# Patient Record
Sex: FEMALE | Race: WHITE | NOT HISPANIC OR LATINO | URBAN - METROPOLITAN AREA
[De-identification: names, ages, dates, MRNs, and addresses within clinical notes are randomized per-mention and may not be internally consistent; named-entity substitution may affect disease eponyms.]

---

## 2024-06-13 ENCOUNTER — APPOINTMENT (OUTPATIENT)
Dept: RADIOLOGY | Facility: HOSPITAL | Age: 79
End: 2024-06-13
Payer: MEDICARE

## 2024-06-13 ENCOUNTER — APPOINTMENT (OUTPATIENT)
Dept: CARDIOLOGY | Facility: HOSPITAL | Age: 79
End: 2024-06-13
Payer: MEDICARE

## 2024-06-13 ENCOUNTER — HOSPITAL ENCOUNTER (INPATIENT)
Facility: HOSPITAL | Age: 79
DRG: 193 | End: 2024-06-13
Attending: INTERNAL MEDICINE | Admitting: NURSE PRACTITIONER
Payer: MEDICARE

## 2024-06-13 DIAGNOSIS — J18.9 PNEUMONIA OF LEFT LOWER LOBE DUE TO INFECTIOUS ORGANISM: ICD-10-CM

## 2024-06-13 DIAGNOSIS — R53.81 PHYSICAL DECONDITIONING: ICD-10-CM

## 2024-06-13 DIAGNOSIS — J86.9 EMPYEMA (MULTI): Primary | ICD-10-CM

## 2024-06-13 LAB
ABO GROUP (TYPE) IN BLOOD: NORMAL
ALBUMIN SERPL BCP-MCNC: 3.3 G/DL (ref 3.4–5)
ALP SERPL-CCNC: 107 U/L (ref 33–136)
ALT SERPL W P-5'-P-CCNC: 47 U/L (ref 7–45)
ANION GAP SERPL CALC-SCNC: 15 MMOL/L (ref 10–20)
ANTIBODY SCREEN: NORMAL
APTT PPP: 28 SECONDS (ref 27–38)
AST SERPL W P-5'-P-CCNC: 41 U/L (ref 9–39)
BASOPHILS # BLD AUTO: 0.06 X10*3/UL (ref 0–0.1)
BASOPHILS NFR BLD AUTO: 0.3 %
BILIRUB SERPL-MCNC: 1.1 MG/DL (ref 0–1.2)
BUN SERPL-MCNC: 21 MG/DL (ref 6–23)
CALCIUM SERPL-MCNC: 8.8 MG/DL (ref 8.6–10.6)
CHLORIDE SERPL-SCNC: 97 MMOL/L (ref 98–107)
CO2 SERPL-SCNC: 23 MMOL/L (ref 21–32)
CREAT SERPL-MCNC: 0.67 MG/DL (ref 0.5–1.05)
EGFRCR SERPLBLD CKD-EPI 2021: 90 ML/MIN/1.73M*2
EOSINOPHIL # BLD AUTO: 0.02 X10*3/UL (ref 0–0.4)
EOSINOPHIL NFR BLD AUTO: 0.1 %
ERYTHROCYTE [DISTWIDTH] IN BLOOD BY AUTOMATED COUNT: 14.1 % (ref 11.5–14.5)
GLUCOSE SERPL-MCNC: 109 MG/DL (ref 74–99)
HCT VFR BLD AUTO: 34.4 % (ref 36–46)
HGB BLD-MCNC: 11.2 G/DL (ref 12–16)
IMM GRANULOCYTES # BLD AUTO: 0.42 X10*3/UL (ref 0–0.5)
IMM GRANULOCYTES NFR BLD AUTO: 2.2 % (ref 0–0.9)
INR PPP: 1.2 (ref 0.9–1.1)
LYMPHOCYTES # BLD AUTO: 0.74 X10*3/UL (ref 0.8–3)
LYMPHOCYTES NFR BLD AUTO: 3.9 %
MAGNESIUM SERPL-MCNC: 2.3 MG/DL (ref 1.6–2.4)
MCH RBC QN AUTO: 28.7 PG (ref 26–34)
MCHC RBC AUTO-ENTMCNC: 32.6 G/DL (ref 32–36)
MCV RBC AUTO: 88 FL (ref 80–100)
MONOCYTES # BLD AUTO: 1.81 X10*3/UL (ref 0.05–0.8)
MONOCYTES NFR BLD AUTO: 9.6 %
NEUTROPHILS # BLD AUTO: 15.88 X10*3/UL (ref 1.6–5.5)
NEUTROPHILS NFR BLD AUTO: 83.9 %
NRBC BLD-RTO: 0 /100 WBCS (ref 0–0)
PLATELET # BLD AUTO: 410 X10*3/UL (ref 150–450)
POTASSIUM SERPL-SCNC: 3.6 MMOL/L (ref 3.5–5.3)
PROT SERPL-MCNC: 7 G/DL (ref 6.4–8.2)
PROTHROMBIN TIME: 13.4 SECONDS (ref 9.8–12.8)
RBC # BLD AUTO: 3.9 X10*6/UL (ref 4–5.2)
RH FACTOR (ANTIGEN D): NORMAL
SODIUM SERPL-SCNC: 131 MMOL/L (ref 136–145)
WBC # BLD AUTO: 18.9 X10*3/UL (ref 4.4–11.3)

## 2024-06-13 PROCEDURE — 1210000001 HC SEMI-PRIVATE ROOM DAILY

## 2024-06-13 PROCEDURE — 71045 X-RAY EXAM CHEST 1 VIEW: CPT

## 2024-06-13 PROCEDURE — 36415 COLL VENOUS BLD VENIPUNCTURE: CPT | Performed by: STUDENT IN AN ORGANIZED HEALTH CARE EDUCATION/TRAINING PROGRAM

## 2024-06-13 PROCEDURE — 82247 BILIRUBIN TOTAL: CPT | Performed by: STUDENT IN AN ORGANIZED HEALTH CARE EDUCATION/TRAINING PROGRAM

## 2024-06-13 PROCEDURE — 85025 COMPLETE CBC W/AUTO DIFF WBC: CPT | Performed by: STUDENT IN AN ORGANIZED HEALTH CARE EDUCATION/TRAINING PROGRAM

## 2024-06-13 PROCEDURE — 85610 PROTHROMBIN TIME: CPT | Performed by: STUDENT IN AN ORGANIZED HEALTH CARE EDUCATION/TRAINING PROGRAM

## 2024-06-13 PROCEDURE — 71045 X-RAY EXAM CHEST 1 VIEW: CPT | Performed by: RADIOLOGY

## 2024-06-13 PROCEDURE — 99223 1ST HOSP IP/OBS HIGH 75: CPT | Performed by: THORACIC SURGERY (CARDIOTHORACIC VASCULAR SURGERY)

## 2024-06-13 PROCEDURE — 2500000004 HC RX 250 GENERAL PHARMACY W/ HCPCS (ALT 636 FOR OP/ED): Performed by: NURSE PRACTITIONER

## 2024-06-13 PROCEDURE — 99223 1ST HOSP IP/OBS HIGH 75: CPT | Performed by: NURSE PRACTITIONER

## 2024-06-13 PROCEDURE — 93005 ELECTROCARDIOGRAM TRACING: CPT

## 2024-06-13 PROCEDURE — 2550000001 HC RX 255 CONTRASTS: Performed by: INTERNAL MEDICINE

## 2024-06-13 PROCEDURE — 2500000005 HC RX 250 GENERAL PHARMACY W/O HCPCS: Performed by: STUDENT IN AN ORGANIZED HEALTH CARE EDUCATION/TRAINING PROGRAM

## 2024-06-13 PROCEDURE — 2500000001 HC RX 250 WO HCPCS SELF ADMINISTERED DRUGS (ALT 637 FOR MEDICARE OP): Performed by: NURSE PRACTITIONER

## 2024-06-13 PROCEDURE — 71260 CT THORAX DX C+: CPT | Performed by: RADIOLOGY

## 2024-06-13 PROCEDURE — 94640 AIRWAY INHALATION TREATMENT: CPT | Mod: MUE

## 2024-06-13 PROCEDURE — 2500000002 HC RX 250 W HCPCS SELF ADMINISTERED DRUGS (ALT 637 FOR MEDICARE OP, ALT 636 FOR OP/ED): Mod: MUE | Performed by: NURSE PRACTITIONER

## 2024-06-13 PROCEDURE — 83735 ASSAY OF MAGNESIUM: CPT | Performed by: STUDENT IN AN ORGANIZED HEALTH CARE EDUCATION/TRAINING PROGRAM

## 2024-06-13 PROCEDURE — 86901 BLOOD TYPING SEROLOGIC RH(D): CPT | Performed by: STUDENT IN AN ORGANIZED HEALTH CARE EDUCATION/TRAINING PROGRAM

## 2024-06-13 PROCEDURE — 71260 CT THORAX DX C+: CPT

## 2024-06-13 PROCEDURE — 87081 CULTURE SCREEN ONLY: CPT | Performed by: NURSE PRACTITIONER

## 2024-06-13 RX ORDER — AZITHROMYCIN 500 MG/1
500 TABLET, FILM COATED ORAL
Status: DISCONTINUED | OUTPATIENT
Start: 2024-06-13 | End: 2024-06-20

## 2024-06-13 RX ORDER — LORAZEPAM 1 MG/1
1 TABLET ORAL ONCE
Status: DISCONTINUED | OUTPATIENT
Start: 2024-06-13 | End: 2024-06-13

## 2024-06-13 RX ORDER — ALBUTEROL SULFATE 0.83 MG/ML
2.5 SOLUTION RESPIRATORY (INHALATION) EVERY 4 HOURS PRN
Status: DISCONTINUED | OUTPATIENT
Start: 2024-06-13 | End: 2024-06-20 | Stop reason: HOSPADM

## 2024-06-13 RX ORDER — ACETAMINOPHEN 325 MG/1
975 TABLET ORAL EVERY 8 HOURS
Status: DISCONTINUED | OUTPATIENT
Start: 2024-06-13 | End: 2024-06-14

## 2024-06-13 RX ORDER — AMOXICILLIN 250 MG
2 CAPSULE ORAL 2 TIMES DAILY
Status: DISCONTINUED | OUTPATIENT
Start: 2024-06-13 | End: 2024-06-20 | Stop reason: HOSPADM

## 2024-06-13 RX ORDER — HYDROXYZINE HYDROCHLORIDE 25 MG/1
25 TABLET, FILM COATED ORAL EVERY 6 HOURS PRN
Status: DISCONTINUED | OUTPATIENT
Start: 2024-06-13 | End: 2024-06-20 | Stop reason: HOSPADM

## 2024-06-13 RX ORDER — ENOXAPARIN SODIUM 100 MG/ML
40 INJECTION SUBCUTANEOUS EVERY 24 HOURS
Status: DISCONTINUED | OUTPATIENT
Start: 2024-06-13 | End: 2024-06-20 | Stop reason: HOSPADM

## 2024-06-13 RX ORDER — IPRATROPIUM BROMIDE AND ALBUTEROL SULFATE 2.5; .5 MG/3ML; MG/3ML
3 SOLUTION RESPIRATORY (INHALATION) 3 TIMES DAILY
Status: DISCONTINUED | OUTPATIENT
Start: 2024-06-13 | End: 2024-06-17

## 2024-06-13 RX ORDER — LOSARTAN POTASSIUM AND HYDROCHLOROTHIAZIDE 25; 100 MG/1; MG/1
1 TABLET ORAL DAILY
COMMUNITY

## 2024-06-13 RX ORDER — BENZONATATE 100 MG/1
100 CAPSULE ORAL 3 TIMES DAILY PRN
Status: DISCONTINUED | OUTPATIENT
Start: 2024-06-13 | End: 2024-06-20 | Stop reason: HOSPADM

## 2024-06-13 RX ORDER — SODIUM CHLORIDE, SODIUM LACTATE, POTASSIUM CHLORIDE, CALCIUM CHLORIDE 600; 310; 30; 20 MG/100ML; MG/100ML; MG/100ML; MG/100ML
100 INJECTION, SOLUTION INTRAVENOUS CONTINUOUS
Status: DISCONTINUED | OUTPATIENT
Start: 2024-06-13 | End: 2024-06-16

## 2024-06-13 RX ORDER — IPRATROPIUM BROMIDE AND ALBUTEROL SULFATE 2.5; .5 MG/3ML; MG/3ML
3 SOLUTION RESPIRATORY (INHALATION)
Status: DISCONTINUED | OUTPATIENT
Start: 2024-06-13 | End: 2024-06-13

## 2024-06-13 SDOH — SOCIAL STABILITY: SOCIAL INSECURITY: DO YOU FEEL UNSAFE GOING BACK TO THE PLACE WHERE YOU ARE LIVING?: NO

## 2024-06-13 SDOH — SOCIAL STABILITY: SOCIAL INSECURITY: ARE THERE ANY APPARENT SIGNS OF INJURIES/BEHAVIORS THAT COULD BE RELATED TO ABUSE/NEGLECT?: NO

## 2024-06-13 SDOH — SOCIAL STABILITY: SOCIAL INSECURITY: ARE YOU OR HAVE YOU BEEN THREATENED OR ABUSED PHYSICALLY, EMOTIONALLY, OR SEXUALLY BY ANYONE?: NO

## 2024-06-13 SDOH — SOCIAL STABILITY: SOCIAL INSECURITY: DO YOU FEEL ANYONE HAS EXPLOITED OR TAKEN ADVANTAGE OF YOU FINANCIALLY OR OF YOUR PERSONAL PROPERTY?: NO

## 2024-06-13 SDOH — SOCIAL STABILITY: SOCIAL INSECURITY: HAVE YOU HAD THOUGHTS OF HARMING ANYONE ELSE?: NO

## 2024-06-13 SDOH — SOCIAL STABILITY: SOCIAL INSECURITY: ABUSE: ADULT

## 2024-06-13 SDOH — SOCIAL STABILITY: SOCIAL INSECURITY: WERE YOU ABLE TO COMPLETE ALL THE BEHAVIORAL HEALTH SCREENINGS?: YES

## 2024-06-13 SDOH — SOCIAL STABILITY: SOCIAL INSECURITY: DOES ANYONE TRY TO KEEP YOU FROM HAVING/CONTACTING OTHER FRIENDS OR DOING THINGS OUTSIDE YOUR HOME?: NO

## 2024-06-13 SDOH — SOCIAL STABILITY: SOCIAL INSECURITY: HAS ANYONE EVER THREATENED TO HURT YOUR FAMILY OR YOUR PETS?: NO

## 2024-06-13 SDOH — SOCIAL STABILITY: SOCIAL INSECURITY: HAVE YOU HAD ANY THOUGHTS OF HARMING ANYONE ELSE?: NO

## 2024-06-13 ASSESSMENT — ENCOUNTER SYMPTOMS
EYES NEGATIVE: 1
GASTROINTESTINAL NEGATIVE: 1
DIAPHORESIS: 1
CHEST TIGHTNESS: 1
COUGH: 1
NEUROLOGICAL NEGATIVE: 1
CARDIOVASCULAR NEGATIVE: 1
MUSCULOSKELETAL NEGATIVE: 1
SHORTNESS OF BREATH: 1

## 2024-06-13 ASSESSMENT — COGNITIVE AND FUNCTIONAL STATUS - GENERAL
DRESSING REGULAR UPPER BODY CLOTHING: A LITTLE
MOBILITY SCORE: 24
PATIENT BASELINE BEDBOUND: NO
DAILY ACTIVITIY SCORE: 22
DRESSING REGULAR LOWER BODY CLOTHING: A LITTLE
MOBILITY SCORE: 24
DRESSING REGULAR LOWER BODY CLOTHING: A LITTLE
DAILY ACTIVITIY SCORE: 22
DRESSING REGULAR UPPER BODY CLOTHING: A LITTLE

## 2024-06-13 ASSESSMENT — PATIENT HEALTH QUESTIONNAIRE - PHQ9
1. LITTLE INTEREST OR PLEASURE IN DOING THINGS: NOT AT ALL
2. FEELING DOWN, DEPRESSED OR HOPELESS: NOT AT ALL
SUM OF ALL RESPONSES TO PHQ9 QUESTIONS 1 & 2: 0

## 2024-06-13 ASSESSMENT — COLUMBIA-SUICIDE SEVERITY RATING SCALE - C-SSRS
2. HAVE YOU ACTUALLY HAD ANY THOUGHTS OF KILLING YOURSELF?: NO
1. IN THE PAST MONTH, HAVE YOU WISHED YOU WERE DEAD OR WISHED YOU COULD GO TO SLEEP AND NOT WAKE UP?: NO
6. HAVE YOU EVER DONE ANYTHING, STARTED TO DO ANYTHING, OR PREPARED TO DO ANYTHING TO END YOUR LIFE?: NO

## 2024-06-13 ASSESSMENT — PAIN SCALES - GENERAL
PAINLEVEL_OUTOF10: 0 - NO PAIN

## 2024-06-13 ASSESSMENT — ACTIVITIES OF DAILY LIVING (ADL)
HEARING - LEFT EAR: FUNCTIONAL
BATHING: INDEPENDENT
WALKS IN HOME: INDEPENDENT
GROOMING: INDEPENDENT
PATIENT'S MEMORY ADEQUATE TO SAFELY COMPLETE DAILY ACTIVITIES?: YES
HEARING - RIGHT EAR: FUNCTIONAL
TOILETING: INDEPENDENT
LACK_OF_TRANSPORTATION: NO
FEEDING YOURSELF: INDEPENDENT
DRESSING YOURSELF: NEEDS ASSISTANCE
ADEQUATE_TO_COMPLETE_ADL: YES
JUDGMENT_ADEQUATE_SAFELY_COMPLETE_DAILY_ACTIVITIES: YES

## 2024-06-13 ASSESSMENT — LIFESTYLE VARIABLES
SKIP TO QUESTIONS 9-10: 1
HOW MANY STANDARD DRINKS CONTAINING ALCOHOL DO YOU HAVE ON A TYPICAL DAY: 1 OR 2
HOW OFTEN DO YOU HAVE 6 OR MORE DRINKS ON ONE OCCASION: NEVER
HOW OFTEN DO YOU HAVE A DRINK CONTAINING ALCOHOL: 2-4 TIMES A MONTH
AUDIT-C TOTAL SCORE: 2
AUDIT-C TOTAL SCORE: 2

## 2024-06-13 ASSESSMENT — PAIN - FUNCTIONAL ASSESSMENT
PAIN_FUNCTIONAL_ASSESSMENT: 0-10
PAIN_FUNCTIONAL_ASSESSMENT: 0-10

## 2024-06-13 NOTE — PROGRESS NOTES
06/13/24 1255   Discharge Planning   Living Arrangements Spouse/significant other   Support Systems Spouse/significant other   Assistance Needed none   Type of Residence Private residence   Home or Post Acute Services None   Patient expects to be discharged to: Home   Does the patient need discharge transport arranged? No   Financial Resource Strain   How hard is it for you to pay for the very basics like food, housing, medical care, and heating? Not hard   Housing Stability   In the last 12 months, was there a time when you were not able to pay the mortgage or rent on time? N   In the last 12 months, how many places have you lived? 1   In the last 12 months, was there a time when you did not have a steady place to sleep or slept in a shelter (including now)? N   Transportation Needs   In the past 12 months, has lack of transportation kept you from medical appointments or from getting medications? no   In the past 12 months, has lack of transportation kept you from meetings, work, or from getting things needed for daily living? No     Plan per Medical/Surgical team: Patient is transferred from an OSH for loculated pneumonia. She is currently on 5 liters of oxygen. Thoracic surgery consulted.    Assessment Note: Patient lives with spouse. She is up and independent. Denies any home care services. Denies any financial or social work needs. Spouse will provide transportation home.    Home Care: denies  PCP: Dr. Rand   Pharmacy: [facility] in [place]  DME: denies  Falls: denies  Dialysis: denies    Potential Barriers: none  Discharge Disposition: home  ADOD: 2-4 days

## 2024-06-13 NOTE — CONSULTS
"Reason For Consult  Loculated LLL collection, neoplasm vs empyema     History Of Present Illness  Marylu Amor is a 78 y.o. female with hx of HTN and remote smoking hx (50 years ago, 2 py) has had cough for past 3 weeks with chest pain and diaphoresis. Seen at [facility] with leukocytosis 19.0 and CT showing rounded masslike component in LLL with cf neoplasm, admitted for treatment of loculated lower lobe effusion.     On 5L NC, mildly uncomfortable and tachypneic. Pt states that she has not had any fever, chills, nausea, or vomiting in the hospital although she has had a lack of appetite. Symptoms have been stable since she's been in the hospital. Repeat CT confirming large multiloculated left pleural effusion without thickening or enhancement, no mass noted.       Past Medical History  She has a past medical history of History of transfusion and Hypertension.    Surgical History  She has a past surgical history that includes Hysterectomy; Eye surgery; and Joint replacement.     Social History  She reports that she has quit smoking. Her smoking use included cigarettes. She has never used smokeless tobacco. She reports that she does not currently use alcohol. She reports that she does not use drugs.    Family History  Family History   Problem Relation Name Age of Onset    Hypertension Mother      Hypertension Father      Hypertension Sister          Allergies  Ciprofloxacin    Review of Systems  Negative except as above      Physical Exam  Constitutional: mildly uncomfortable   Neuro: A/O x4, no gross deficits   Psych: normal affect  HEENT: No deformities, no scleral icterus   Cardiac: RRR  Pulmonary: labored tachypneic respirations on 5L   Abdomen: soft, non distended, non tender  Skin: warm and dry overall    Extremities: no swelling noted  MSK: moving all four       Last Recorded Vitals  Blood pressure (!) 143/100, pulse 104, temperature 36.7 °C (98.1 °F), resp. rate 16, height 1.575 m (5' 2\"), weight " 98.5 kg (217 lb 3.2 oz), SpO2 94%.    Relevant Results  Results for orders placed or performed during the hospital encounter of 06/13/24 (from the past 24 hour(s))   CBC and Auto Differential   Result Value Ref Range    WBC 18.9 (H) 4.4 - 11.3 x10*3/uL    nRBC 0.0 0.0 - 0.0 /100 WBCs    RBC 3.90 (L) 4.00 - 5.20 x10*6/uL    Hemoglobin 11.2 (L) 12.0 - 16.0 g/dL    Hematocrit 34.4 (L) 36.0 - 46.0 %    MCV 88 80 - 100 fL    MCH 28.7 26.0 - 34.0 pg    MCHC 32.6 32.0 - 36.0 g/dL    RDW 14.1 11.5 - 14.5 %    Platelets 410 150 - 450 x10*3/uL    Neutrophils % 83.9 40.0 - 80.0 %    Immature Granulocytes %, Automated 2.2 (H) 0.0 - 0.9 %    Lymphocytes % 3.9 13.0 - 44.0 %    Monocytes % 9.6 2.0 - 10.0 %    Eosinophils % 0.1 0.0 - 6.0 %    Basophils % 0.3 0.0 - 2.0 %    Neutrophils Absolute 15.88 (H) 1.60 - 5.50 x10*3/uL    Immature Granulocytes Absolute, Automated 0.42 0.00 - 0.50 x10*3/uL    Lymphocytes Absolute 0.74 (L) 0.80 - 3.00 x10*3/uL    Monocytes Absolute 1.81 (H) 0.05 - 0.80 x10*3/uL    Eosinophils Absolute 0.02 0.00 - 0.40 x10*3/uL    Basophils Absolute 0.06 0.00 - 0.10 x10*3/uL   Comprehensive Metabolic Panel   Result Value Ref Range    Glucose 109 (H) 74 - 99 mg/dL    Sodium 131 (L) 136 - 145 mmol/L    Potassium 3.6 3.5 - 5.3 mmol/L    Chloride 97 (L) 98 - 107 mmol/L    Bicarbonate 23 21 - 32 mmol/L    Anion Gap 15 10 - 20 mmol/L    Urea Nitrogen 21 6 - 23 mg/dL    Creatinine 0.67 0.50 - 1.05 mg/dL    eGFR 90 >60 mL/min/1.73m*2    Calcium 8.8 8.6 - 10.6 mg/dL    Albumin 3.3 (L) 3.4 - 5.0 g/dL    Alkaline Phosphatase 107 33 - 136 U/L    Total Protein 7.0 6.4 - 8.2 g/dL    AST 41 (H) 9 - 39 U/L    Bilirubin, Total 1.1 0.0 - 1.2 mg/dL    ALT 47 (H) 7 - 45 U/L   Magnesium   Result Value Ref Range    Magnesium 2.30 1.60 - 2.40 mg/dL   Coagulation Screen   Result Value Ref Range    Protime 13.4 (H) 9.8 - 12.8 seconds    INR 1.2 (H) 0.9 - 1.1    aPTT 28 27 - 38 seconds   Type And Screen   Result Value Ref Range    ABO  TYPE O     Rh TYPE POS     ANTIBODY SCREEN NEG      IMAGING   1.  Large multiloculated left pleural effusion with adjacent   atelectasis. The sterility of this fluid collection is unable to be   assessed. No evidence of pleural thickening/enhancement. Recommend   follow-up chest CT 4-6 weeks after appropriate treatment to evaluate   for resolution.   2. Trace right pleural effusion with right middle lobe and lower lobe   opacities favored to represent atelectasis.        Assessment/Plan     Marylu Amor is a 78 y.o. female with hx of HTN and remote smoking hx (50 years ago, 2 py) has had cough for past 3 weeks with chest pain and diaphoresis. Seen at [facility] with leukocytosis 19.0 and CT showing rounded masslike component in LLL with cf neoplasm, admitted for treatment of loculated lower lobe effusion. Repeat CT confirming large multiloculated left pleural effusion without thickening or enhancement, no mass noted.      -Recommend IR pigtail for drainage  -will re-evaluate after for possible lytic therapy     Discussed with Dr. Nicola Salcido MD

## 2024-06-13 NOTE — H&P
HPI     Ms John is a 78-year-old female with PMHx: HTN who presented to [facility] from [facility].  Patient states she had had a moist productive cough for the past 3 weeks it has been worsening states it been hurting to cough and she been waking up in the middle of the night diaphoretic.  She went to the urgent care in [place] was found to have a loculated pneumonia and was transferred to [facility] where they started her on IV antibiotics and she was then transferred to [facility] for further care.  While at [facility] she was found to have a WBC of 19.0 and CT scan showed a rounded masslike component in the LLL which was suspicious for neoplasm.  On exam patient sitting up on the side of the bed mild distress wearing 5 L nasal cannula oxygen states she does not wear any at home.  Denies any frequent pneumonia history states last time she had pneumonia was 2020 when she got COVID prior to that she could not even remember she thought it was maybe 1 other time.  She does have a remote smoking history she quit 48 years ago denies any drugs or frequent alcohol.  Patient to be admitted for pneumonia loculated left lower lobe with empyema.    ROS/EXAM     Review of Systems   Constitutional:  Positive for diaphoresis.        States waking up in the middle of the night diaphoretic   HENT: Negative.     Eyes: Negative.    Respiratory:  Positive for cough, chest tightness and shortness of breath.    Cardiovascular: Negative.    Gastrointestinal: Negative.    Genitourinary: Negative.    Musculoskeletal: Negative.    Skin: Negative.    Neurological: Negative.    All other systems reviewed and are negative.    Physical Exam  Vitals reviewed.   Constitutional:       Appearance: Normal appearance. She is obese. She is ill-appearing.   HENT:      Head: Normocephalic.      Mouth/Throat:      Mouth: Mucous membranes are dry.   Eyes:      Extraocular Movements: Extraocular movements intact.      Conjunctiva/sclera:  "Conjunctivae normal.      Pupils: Pupils are equal, round, and reactive to light.   Cardiovascular:      Rate and Rhythm: Normal rate and regular rhythm.      Pulses: Normal pulses.      Heart sounds: Normal heart sounds, S1 normal and S2 normal.   Pulmonary:      Breath sounds: Normal air entry.      Comments: Lungs tight/diminished, wearing 5l NC sitting up on side of bed  Abdominal:      General: Abdomen is flat. Bowel sounds are normal.      Palpations: Abdomen is soft.   Musculoskeletal:         General: Normal range of motion.      Cervical back: Full passive range of motion without pain and normal range of motion.   Skin:     General: Skin is warm and dry.   Neurological:      General: No focal deficit present.      Mental Status: She is alert and oriented to person, place, and time. Mental status is at baseline.   Psychiatric:         Attention and Perception: Attention normal.         Mood and Affect: Mood normal.         Speech: Speech normal.         Vitals/LABS/RESULTS     Last Recorded Vitals  Blood pressure (!) 143/100, pulse 104, temperature 36.7 °C (98.1 °F), resp. rate 16, height 1.575 m (5' 2\"), weight 92.5 kg (204 lb), SpO2 93%.  Intake/Output last 3 Shifts:  No intake/output data recorded.    Relevant Results  No results found for: \"WBC\", \"HGB\", \"HCT\", \"MCV\", \"PLT\"   No results found for: \"GLUCOSE\", \"CALCIUM\", \"NA\", \"K\", \"CO2\", \"CL\", \"BUN\", \"CREATININE\"  No results found.    Medications     Scheduled medications  acetaminophen, 975 mg, oral, q8h  azithromycin, 500 mg, oral, q24h [facility]  [Held by provider] enoxaparin, 40 mg, subcutaneous, q24h  iohexol, 75 mL, intravenous, Once in imaging  losartan 100 mg, hydroCHLOROthiazide 25 mg for Hyzaar 100/25, , oral, Daily  oxygen, , inhalation, Continuous - Inhalation  piperacillin-tazobactam, 4.5 g, intravenous, q6h  sennosides-docusate sodium, 2 tablet, oral, BID      Continuous medications     PRN medications  PRN medications: hydrOXYzine HCL    PLAN   Ms " Dora is a 78-year-old female with PMHx: HTN who presented to [facility] from [facility].  Patient states she had had a moist productive cough for the past 3 weeks it has been worsening states it been hurting to cough and she been waking up in the middle of the night diaphoretic.  She went to the urgent care in [place] was found to have a loculated pneumonia and was transferred to [facility] where they started her on IV antibiotics and she was then transferred to [facility] for further care.  While at [facility] she was found to have a WBC of 19.0 and CT scan showed a rounded masslike component in the LLL which was suspicious for neoplasm.  On exam patient sitting up on the side of the bed mild distress wearing 5 L nasal cannula oxygen states she does not wear any at home.  Denies any frequent pneumonia history states last time she had pneumonia was 2020 when she got COVID prior to that she could not even remember she thought it was maybe 1 other time.  She does have a remote smoking history she quit 48 years ago denies any drugs or frequent alcohol.  Patient to be admitted for pneumonia loculated left lower lobe with empyema.    Assessment/Plan:    Pneumonia LLL Loculated vs Mass  CT Scan from [facility] shows LLL PNA w/ loculation/empyema vs Mass  WBC at  19.0  Will order MRSA swab  Will order new scans and XRAYs for surgery consideration  Will Thoracic consult  Will order Zosyn 4.5 q6  Will order Azithromycin 500mg IV daily  -will order duonebs schedule and prn albuterol  Repeat Labs  Hydroxizine prn for anxiety  O2 to titrate above 90%    HTN  -c/w home Losartan/HCTZ    Fluids: monitor and replete as needed  Electrolytes: monitor and replete as needed  Nutrition: NPO  GI prophylaxis: as needed  DVT prophylaxis: SCD  Code Status: Full Code  PCP  Pharmacy    Disposition:  Admitted for above diagnoses. Plan per above. Anticipate hospitalization >2 midnights.       Candy Blair, APRN-CNP         I spent 75  minutes in the professional and overall care on this encounter before, during and after the visit, examining the patient, reviewing labs, writing orders and documenting the note, reviewing notes from OSH including labs and CT scan and meds

## 2024-06-13 NOTE — SIGNIFICANT EVENT
Thoracic Surgery NP     Consult request received.   Formal consult note to follow after review of CT imaging.    Macey Perez, APRN-CNP

## 2024-06-13 NOTE — SIGNIFICANT EVENT
CT scan    Patient came from [facility] labs yesterday showed a creatinine of 1.1 and BUN 23.    Patient is okay from hospital standpoint to go to stat CT chest with IV contrast prior to  labs resulting    Candy SAPP

## 2024-06-13 NOTE — CONSULTS
"Nutrition Initial Assessment:   Nutrition Assessment    Reason for Assessment: Admission nursing screening    Patient is a 78 y.o. female presenting with worsening cough x3 weeks. Diagnosed w/ PNA & started on antibiotic regimen at OSH. Currently w/ new oxygen requirement.    CT from OSH with LLL mass suspicious for neoplasm vs loculation/empyema. Pt s/p repeat CT today, results pending.    Nutrition History:  Food and Nutrient History: Pt unavailable at attempted visit x2. Pt off floor for CT at initial visit & speaking w/ MD regarding imaging results at second attempt. Unable to obtain nutritional history as a result. Will attempt to gather further information at the follow up.  Vitamin/Herbal Supplement Use: Unable to assess  Food Allergies/Intolerances:  None  GI Symptoms:  Unable to assess  Oral Problems:  Unable to assess     Anthropometrics:  Height: 157.5 cm (5' 2\")   Weight: 98.5 kg (217 lb 3.2 oz)   BMI (Calculated): 39.72  IBW/kg (Dietitian Calculated): 50 kg  Percent of IBW: 197 %     Weight History:   Wt Readings from Last 30 Encounters:   06/13/24 98.5 kg (217 lb 3.2 oz) --> Admit wt     Weight Change %:  Weight History / % Weight Change: No hx weights in EMR at time of assessment. Unable to assess for weight changes.    Nutrition Focused Physical Exam Findings:  defer: Pt unavailable at attempted visit x2    Edema:  Edema: none    Physical Findings:  Skin:  (Intact)    Nutrition Significant Labs:  BMP Trend:   Results from last 7 days   Lab Units 06/13/24  0825   GLUCOSE mg/dL 109*   CALCIUM mg/dL 8.8   SODIUM mmol/L 131*   POTASSIUM mmol/L 3.6   CO2 mmol/L 23   CHLORIDE mmol/L 97*   BUN mg/dL 21   CREATININE mg/dL 0.67      Nutrition Specific Medications:  Scheduled medications  azithromycin, 500 mg, oral, q24h WILBERTO  piperacillin-tazobactam, 4.5 g, intravenous, q6h  sennosides-docusate sodium, 2 tablet, oral, BID    Continuous medications  lactated Ringer's, 100 mL/hr    I/O:   No recorded BM since " admission.     Dietary Orders (From admission, onward)       Start     Ordered    06/13/24 0709  NPO Diet; Effective now  Diet effective now         06/13/24 0709             Estimated Needs:   Total Energy Estimated Needs (kCal):  (6653-4468)  Method for Estimating Needs: MSJ (REE: 1429) x 1.2-1.3  Total Protein Estimated Needs (g):  (65-75)  Method for Estimating Needs: 1.3-1.5 g/kg x IBW  Total Fluid Estimated Needs (mL):  (6061-1733)  Method for Estimating Needs: 1mL/kcal or per team        Nutrition Diagnosis   Malnutrition Diagnosis  Patient has Malnutrition Diagnosis: No (Unable to accurately assess given limited information)    Nutrition Diagnosis  Patient has Nutrition Diagnosis: Yes  Diagnosis Status (1): New  Nutrition Diagnosis 1: Increased nutrient needs  Related to (1): increased metabolic demand  As Evidenced by (1): pneumonia with LLL mass vs empyema       Nutrition Interventions/Recommendations         Nutrition Prescription:  Recommend advancing to a liberalized regular diet as deemed medically feasible  Will provide Ensure Plus BID to assist in meeting needs    Consider a daily senior MVI w/ minerals    Check vitamin D level & replete PRN            Nutrition Interventions:   Interventions: Medical food supplement  Medical Food Supplement: Commercial beverage  Goal: Drink Ensure Plus at least daily    Nutrition Monitoring and Evaluation   Food/Nutrient Related History Monitoring  Monitoring and Evaluation Plan: Energy intake  Energy Intake: Estimated energy intake  Criteria: Meet >75% EENs    Time Spent/Follow-up Reminder:   Time Spent (min): 60 minutes  Last Date of Nutrition Visit: 06/13/24  Nutrition Follow-Up Needed?: Dietitian to reassess per policy  Follow up Comment: PO diet + ONS

## 2024-06-14 ENCOUNTER — APPOINTMENT (OUTPATIENT)
Dept: RADIOLOGY | Facility: HOSPITAL | Age: 79
DRG: 193 | End: 2024-06-14
Payer: MEDICARE

## 2024-06-14 LAB
ANION GAP SERPL CALC-SCNC: 15 MMOL/L (ref 10–20)
BUN SERPL-MCNC: 19 MG/DL (ref 6–23)
CALCIUM SERPL-MCNC: 9.1 MG/DL (ref 8.6–10.6)
CHLORIDE SERPL-SCNC: 98 MMOL/L (ref 98–107)
CO2 SERPL-SCNC: 26 MMOL/L (ref 21–32)
CREAT SERPL-MCNC: 0.73 MG/DL (ref 0.5–1.05)
EGFRCR SERPLBLD CKD-EPI 2021: 85 ML/MIN/1.73M*2
ERYTHROCYTE [DISTWIDTH] IN BLOOD BY AUTOMATED COUNT: 13.9 % (ref 11.5–14.5)
GLUCOSE SERPL-MCNC: 118 MG/DL (ref 74–99)
HCT VFR BLD AUTO: 32.2 % (ref 36–46)
HGB BLD-MCNC: 10.9 G/DL (ref 12–16)
MCH RBC QN AUTO: 28.2 PG (ref 26–34)
MCHC RBC AUTO-ENTMCNC: 33.9 G/DL (ref 32–36)
MCV RBC AUTO: 83 FL (ref 80–100)
NRBC BLD-RTO: 0 /100 WBCS (ref 0–0)
PLATELET # BLD AUTO: 446 X10*3/UL (ref 150–450)
POTASSIUM SERPL-SCNC: 3.8 MMOL/L (ref 3.5–5.3)
RBC # BLD AUTO: 3.87 X10*6/UL (ref 4–5.2)
SODIUM SERPL-SCNC: 135 MMOL/L (ref 136–145)
STAPHYLOCOCCUS SPEC CULT: NORMAL
WBC # BLD AUTO: 18.2 X10*3/UL (ref 4.4–11.3)

## 2024-06-14 PROCEDURE — 32557 INSERT CATH PLEURA W/ IMAGE: CPT

## 2024-06-14 PROCEDURE — 2500000002 HC RX 250 W HCPCS SELF ADMINISTERED DRUGS (ALT 637 FOR MEDICARE OP, ALT 636 FOR OP/ED): Performed by: NURSE PRACTITIONER

## 2024-06-14 PROCEDURE — 99152 MOD SED SAME PHYS/QHP 5/>YRS: CPT

## 2024-06-14 PROCEDURE — 99233 SBSQ HOSP IP/OBS HIGH 50: CPT | Performed by: THORACIC SURGERY (CARDIOTHORACIC VASCULAR SURGERY)

## 2024-06-14 PROCEDURE — 85027 COMPLETE CBC AUTOMATED: CPT | Performed by: INTERNAL MEDICINE

## 2024-06-14 PROCEDURE — 2500000001 HC RX 250 WO HCPCS SELF ADMINISTERED DRUGS (ALT 637 FOR MEDICARE OP): Performed by: NURSE PRACTITIONER

## 2024-06-14 PROCEDURE — 2720000007 HC OR 272 NO HCPCS

## 2024-06-14 PROCEDURE — 36415 COLL VENOUS BLD VENIPUNCTURE: CPT | Performed by: INTERNAL MEDICINE

## 2024-06-14 PROCEDURE — 99233 SBSQ HOSP IP/OBS HIGH 50: CPT | Performed by: INTERNAL MEDICINE

## 2024-06-14 PROCEDURE — 2500000005 HC RX 250 GENERAL PHARMACY W/O HCPCS: Performed by: STUDENT IN AN ORGANIZED HEALTH CARE EDUCATION/TRAINING PROGRAM

## 2024-06-14 PROCEDURE — C1729 CATH, DRAINAGE: HCPCS

## 2024-06-14 PROCEDURE — 99152 MOD SED SAME PHYS/QHP 5/>YRS: CPT | Performed by: RADIOLOGY

## 2024-06-14 PROCEDURE — 3E03317 INTRODUCTION OF OTHER THROMBOLYTIC INTO PERIPHERAL VEIN, PERCUTANEOUS APPROACH: ICD-10-PCS | Performed by: PHYSICIAN ASSISTANT

## 2024-06-14 PROCEDURE — 80048 BASIC METABOLIC PNL TOTAL CA: CPT | Performed by: INTERNAL MEDICINE

## 2024-06-14 PROCEDURE — 7100000009 HC PHASE TWO TIME - INITIAL BASE CHARGE

## 2024-06-14 PROCEDURE — 94640 AIRWAY INHALATION TREATMENT: CPT | Mod: MUE

## 2024-06-14 PROCEDURE — 2500000004 HC RX 250 GENERAL PHARMACY W/ HCPCS (ALT 636 FOR OP/ED): Performed by: NURSE PRACTITIONER

## 2024-06-14 PROCEDURE — 99153 MOD SED SAME PHYS/QHP EA: CPT

## 2024-06-14 PROCEDURE — 2500000004 HC RX 250 GENERAL PHARMACY W/ HCPCS (ALT 636 FOR OP/ED): Performed by: RADIOLOGY

## 2024-06-14 PROCEDURE — 1210000001 HC SEMI-PRIVATE ROOM DAILY

## 2024-06-14 PROCEDURE — 32557 INSERT CATH PLEURA W/ IMAGE: CPT | Performed by: RADIOLOGY

## 2024-06-14 PROCEDURE — 87070 CULTURE OTHR SPECIMN AEROBIC: CPT | Performed by: STUDENT IN AN ORGANIZED HEALTH CARE EDUCATION/TRAINING PROGRAM

## 2024-06-14 PROCEDURE — 7100000010 HC PHASE TWO TIME - EACH INCREMENTAL 1 MINUTE

## 2024-06-14 PROCEDURE — 2500000004 HC RX 250 GENERAL PHARMACY W/ HCPCS (ALT 636 FOR OP/ED): Performed by: STUDENT IN AN ORGANIZED HEALTH CARE EDUCATION/TRAINING PROGRAM

## 2024-06-14 PROCEDURE — 87040 BLOOD CULTURE FOR BACTERIA: CPT | Performed by: INTERNAL MEDICINE

## 2024-06-14 RX ORDER — MIDAZOLAM HYDROCHLORIDE 1 MG/ML
INJECTION INTRAMUSCULAR; INTRAVENOUS
Status: COMPLETED | OUTPATIENT
Start: 2024-06-14 | End: 2024-06-14

## 2024-06-14 RX ORDER — OXYCODONE HYDROCHLORIDE 5 MG/1
10 TABLET ORAL EVERY 4 HOURS PRN
Status: DISCONTINUED | OUTPATIENT
Start: 2024-06-14 | End: 2024-06-20 | Stop reason: HOSPADM

## 2024-06-14 RX ORDER — LIDOCAINE 560 MG/1
1 PATCH PERCUTANEOUS; TOPICAL; TRANSDERMAL DAILY
Status: DISCONTINUED | OUTPATIENT
Start: 2024-06-14 | End: 2024-06-20 | Stop reason: HOSPADM

## 2024-06-14 RX ORDER — HYDROMORPHONE HYDROCHLORIDE 1 MG/ML
0.2 INJECTION, SOLUTION INTRAMUSCULAR; INTRAVENOUS; SUBCUTANEOUS EVERY 4 HOURS PRN
Status: DISCONTINUED | OUTPATIENT
Start: 2024-06-14 | End: 2024-06-20 | Stop reason: HOSPADM

## 2024-06-14 RX ORDER — KETOROLAC TROMETHAMINE 30 MG/ML
15 INJECTION, SOLUTION INTRAMUSCULAR; INTRAVENOUS ONCE
Status: COMPLETED | OUTPATIENT
Start: 2024-06-14 | End: 2024-06-14

## 2024-06-14 RX ORDER — ACETAMINOPHEN 325 MG/1
650 TABLET ORAL 4 TIMES DAILY
Status: DISCONTINUED | OUTPATIENT
Start: 2024-06-14 | End: 2024-06-18

## 2024-06-14 RX ORDER — OXYCODONE HYDROCHLORIDE 5 MG/1
5 TABLET ORAL EVERY 4 HOURS PRN
Status: DISCONTINUED | OUTPATIENT
Start: 2024-06-14 | End: 2024-06-20 | Stop reason: HOSPADM

## 2024-06-14 RX ORDER — FENTANYL CITRATE 50 UG/ML
INJECTION, SOLUTION INTRAMUSCULAR; INTRAVENOUS
Status: COMPLETED | OUTPATIENT
Start: 2024-06-14 | End: 2024-06-14

## 2024-06-14 RX ORDER — ONDANSETRON 4 MG/1
4 TABLET, FILM COATED ORAL EVERY 8 HOURS PRN
Status: DISCONTINUED | OUTPATIENT
Start: 2024-06-14 | End: 2024-06-20 | Stop reason: HOSPADM

## 2024-06-14 RX ORDER — ONDANSETRON HYDROCHLORIDE 2 MG/ML
4 INJECTION, SOLUTION INTRAVENOUS EVERY 8 HOURS PRN
Status: DISCONTINUED | OUTPATIENT
Start: 2024-06-14 | End: 2024-06-20 | Stop reason: HOSPADM

## 2024-06-14 ASSESSMENT — COGNITIVE AND FUNCTIONAL STATUS - GENERAL
TURNING FROM BACK TO SIDE WHILE IN FLAT BAD: A LITTLE
STANDING UP FROM CHAIR USING ARMS: A LITTLE
MOBILITY SCORE: 17
DRESSING REGULAR LOWER BODY CLOTHING: A LITTLE
DRESSING REGULAR UPPER BODY CLOTHING: A LITTLE
DAILY ACTIVITIY SCORE: 22
WALKING IN HOSPITAL ROOM: A LOT
CLIMB 3 TO 5 STEPS WITH RAILING: A LOT
MOVING FROM LYING ON BACK TO SITTING ON SIDE OF FLAT BED WITH BEDRAILS: A LITTLE

## 2024-06-14 ASSESSMENT — PAIN SCALES - GENERAL
PAINLEVEL_OUTOF10: 1
PAINLEVEL_OUTOF10: 0 - NO PAIN
PAINLEVEL_OUTOF10: 10 - WORST POSSIBLE PAIN
PAINLEVEL_OUTOF10: 8
PAINLEVEL_OUTOF10: 10 - WORST POSSIBLE PAIN
PAINLEVEL_OUTOF10: 0 - NO PAIN

## 2024-06-14 ASSESSMENT — PAIN - FUNCTIONAL ASSESSMENT
PAIN_FUNCTIONAL_ASSESSMENT: 0-10

## 2024-06-14 ASSESSMENT — PAIN DESCRIPTION - DESCRIPTORS: DESCRIPTORS: DISCOMFORT

## 2024-06-14 ASSESSMENT — PAIN DESCRIPTION - LOCATION: LOCATION: RIB CAGE

## 2024-06-14 ASSESSMENT — PAIN DESCRIPTION - ORIENTATION: ORIENTATION: LEFT

## 2024-06-14 NOTE — CARE PLAN
The patient's goals for the shift include      The clinical goals for the shift include To have pt's breathing better throughout shift    Over the shift, the patient did not make progress toward the following goals. Barriers to progression include . Recommendations to address these barriers include .

## 2024-06-14 NOTE — PRE-PROCEDURE NOTE
Interventional Radiology Preprocedure Note    Indication for procedure: The encounter diagnosis was Empyema (Multi).    Relevant review of systems: NA    Relevant Labs:   Lab Results   Component Value Date    CREATININE 0.73 06/14/2024    EGFR 85 06/14/2024    INR 1.2 (H) 06/13/2024    PROTIME 13.4 (H) 06/13/2024       Planned Sedation/Anesthesia: Minimal    Airway assessment: normal    Directed physical examination:    Physical Exam  Constitutional:       Appearance: She is ill-appearing.   Pulmonary:      Comments: Respiratory Rate 24  Neurological:      Mental Status: She is alert.   Psychiatric:      Comments: Anxious          Mallampati: III (soft and hard palate and base of uvula visible)    ASA Score: ASA 2 - Patient with mild systemic disease with no functional limitations    Benefits, risks and alternatives of procedure and planned sedation have been discussed with the patient and/or their representative. All questions answered and they agree to proceed.

## 2024-06-14 NOTE — PROGRESS NOTES
"Thoracic Surgery Progress Note  6/14/2024    Marylu Amor is a 78 y.o. female with hx of HTN and remote smoking hx (50 years ago, 2 py) has had cough for past 3 weeks with chest pain and diaphoresis. Seen at [facility] with leukocytosis 19.0 and CT showing rounded masslike component in LLL with cf neoplasm, admitted for treatment of loculated lower lobe effusion. Repeat CT confirming large multiloculated left pleural effusion without thickening or enhancement, no mass noted.      Overnight issues: No issues. Going to IR this AM for drain. Still on 5L NC, tachypneic and uncomfortable appearing.     Physical Exam:  General: She is a pleasant female currently in no distress.  Visit Vitals  /79 (BP Location: Left arm, Patient Position: Sitting)   Pulse 106   Temp 36.3 °C (97.3 °F) (Temporal)   Resp 24   Ht 1.575 m (5' 2.01\")   Wt 98.5 kg (217 lb 3.2 oz)   SpO2 93%   BMI 39.72 kg/m²   Smoking Status Former   BSA 2.08 m²     Body mass index is 39.72 kg/m².   Constitutional: mildly uncomfortable   Neuro: A/O x4, no gross deficits   Psych: normal affect  HEENT: No deformities, no scleral icterus   Cardiac: RRR  Pulmonary: labored tachypneic respirations on 5L   Abdomen: soft, non distended, non tender  Skin: warm and dry overall    Extremities: no swelling noted  MSK: moving all four    Diagnostics:     Intake/Output Summary (Last 24 hours) at 6/14/2024 1427  Last data filed at 6/14/2024 1044  Gross per 24 hour   Intake 1035 ml   Output 90 ml   Net 945 ml     Results from last 7 days   Lab Units 06/14/24  0701 06/13/24  0825   WBC AUTO x10*3/uL 18.2* 18.9*   HEMOGLOBIN g/dL 10.9* 11.2*   HEMATOCRIT % 32.2* 34.4*   PLATELETS AUTO x10*3/uL 446 410     Results from last 7 days   Lab Units 06/14/24  0701 06/13/24  0825   SODIUM mmol/L 135* 131*   POTASSIUM mmol/L 3.8 3.6   CHLORIDE mmol/L 98 97*   CO2 mmol/L 26 23   BUN mg/dL 19 21   CREATININE mg/dL 0.73 0.67   GLUCOSE mg/dL 118* 109*   CALCIUM mg/dL 9.1 8.8 "     Results from last 7 days   Lab Units 06/14/24  0701 06/13/24  0825   SODIUM mmol/L 135* 131*   POTASSIUM mmol/L 3.8 3.6   CHLORIDE mmol/L 98 97*   CO2 mmol/L 26 23   BUN mg/dL 19 21   CREATININE mg/dL 0.73 0.67   GLUCOSE mg/dL 118* 109*   CALCIUM mg/dL 9.1 8.8     Scheduled medications  acetaminophen, 975 mg, oral, q8h  alteplase (Activase) 10 mg in sodium chloride 0.9% 50 mL syringe, 10 mg, intrapleural, Once  alteplase (Activase) 10 mg in sodium chloride 0.9% 50 mL syringe, 10 mg, intrapleural, Once  azithromycin, 500 mg, oral, q24h WILBERTO  dornase Alpha (Pulmozyme) 5 mg in sodium chloride 0.9% 50 mL syringe, 5 mg, intrapleural, Once  dornase Alpha (Pulmozyme) 5 mg in sodium chloride 0.9% 50 mL syringe, 5 mg, intrapleural, Once  [Held by provider] enoxaparin, 40 mg, subcutaneous, q24h  ipratropium-albuteroL, 3 mL, nebulization, TID  losartan 100 mg, hydroCHLOROthiazide 25 mg for Hyzaar 100/25, , oral, Daily  oxygen, , inhalation, Continuous - Inhalation  piperacillin-tazobactam, 4.5 g, intravenous, q6h  sennosides-docusate sodium, 2 tablet, oral, BID  sodium chloride 0.9 % 60 mL intrapleural syringe, , intrapleural, Once  sodium chloride 0.9 % 60 mL intrapleural syringe, , intrapleural, Once      Continuous medications  lactated Ringer's, 100 mL/hr, Last Rate: 100 mL/hr (06/14/24 0244)      PRN medications  PRN medications: albuterol, benzocaine-menthol, benzonatate, hydrOXYzine HCL, ondansetron **OR** ondansetron    Imaging:   CT Chest 6/13   1.  Large multiloculated left pleural effusion with adjacent   atelectasis. The sterility of this fluid collection is unable to be   assessed. No evidence of pleural thickening/enhancement. Recommend   follow-up chest CT 4-6 weeks after appropriate treatment to evaluate   for resolution.   2. Trace right pleural effusion with right middle lobe and lower lobe   opacities favored to represent atelectasis.       Assessment:  Marylu Amor is a 78 y.o. female with hx of HTN and  remote smoking hx (50 years ago, 2 py) has had cough for past 3 weeks with chest pain and diaphoresis. Seen at [facility] with leukocytosis 19.0 and CT showing rounded masslike component in LLL with cf neoplasm, admitted for treatment of loculated lower lobe effusion. Repeat CT confirming large multiloculated left pleural effusion without thickening or enhancement, no mass noted.      -lytic therapy through IR pigtail BID    -keep IR pigtail to -20 sxn   -consider repeat CT Monday       Discussed with Dr. Petersen

## 2024-06-14 NOTE — CARE PLAN
Problem: Respiratory  Goal: Clear secretions with interventions this shift  Outcome: Met  Goal: Minimize anxiety/maximize coping throughout shift  Outcome: Progressing  Goal: Minimal/no exertional discomfort or dyspnea this shift  Outcome: Progressing  Goal: No signs of respiratory distress (eg. Use of accessory muscles. Peds grunting)  Outcome: Progressing  Goal: Patent airway maintained this shift  Outcome: Met  Goal: Tolerate pulmonary toileting this shift  Outcome: Progressing  Goal: Verbalize decreased shortness of breath this shift  Outcome: Not Progressing  Goal: Increase self care and/or family involvement in next 24 hours  Outcome: Progressing  Problem: Respiratory  Goal: Verbalize decreased shortness of breath this shift  Outcome: Not Progressing     Problem: Fall/Injury  Goal: Not fall by end of shift  Outcome: Met  Goal: Be free from injury by end of the shift  Outcome: Met  Goal: Verbalize understanding of personal risk factors for fall in the hospital  Outcome: Met  Goal: Verbalize understanding of risk factor reduction measures to prevent injury from fall in the home  Outcome: Met  Goal: Pace activities to prevent fatigue by end of the shift  Outcome: Progressing      The clinical goals for the shift include Marylu will remain free from respiratory disress this shift.

## 2024-06-14 NOTE — POST-PROCEDURE NOTE
Interventional Radiology Brief Postprocedure Note    Attending: Dr. Bharati Doan    Assistant: N/A    Diagnosis: Loculated Left Pleural Fluid Collection     Description of procedure: Status post ultrasound guided placement of a left chest tube.     Anesthesia:  Local    Complications: None    Estimated Blood Loss: none    Medications  As of 06/14/24 1111      enoxaparin (Lovenox) syringe 40 mg (mg) Total dose:  Cannot be calculated* Dosing weight:  92.5   *Administration dose not documented     Date/Time Rate/Dose/Volume Action       06/13/24 0730 *40 mg Missed      0748 *Not included in total Held by provider     06/14/24 0730 *Not included in total Automatically Held               sennosides-docusate sodium (Rebecca-Colace) 8.6-50 mg per tablet 2 tablet (tablet) Total dose:  2 tablet* Dosing weight:  92.5   *Administration not included in total     Date/Time Rate/Dose/Volume Action       06/13/24 0911 2 tablet Given      2100 *2 tablet Missed               oxygen (O2) therapy (L/min) Total volume:  Not documented* Dosing weight:  92.5   *Total volume has not been documented. View each administration to see the amount administered.     Date/Time Rate/Dose/Volume Action       06/13/24  1017 4 L/min Start      2000 5 L/min Start               LORazepam (Ativan) tablet 1 mg (mg) Total dose:  0 mg* Dosing weight:  92.5   *Administration not included in total     Date/Time Rate/Dose/Volume Action       06/13/24 0730 *1 mg Missed               hydrOXYzine HCL (Atarax) tablet 25 mg (mg) Total dose:  25 mg Dosing weight:  92.5      Date/Time Rate/Dose/Volume Action       06/13/24 0911 25 mg Given               piperacillin-tazobactam-dextrose (Zosyn) IV 4.5 g (mL/hr) Total dose:  9 g* Dosing weight:  92.5   *From user-documented volume     Date/Time Rate/Dose/Volume Action       06/13/24  1045 4.5 g - 200 mL/hr (over 30 min) New Bag      1115 100 mL Stopped      1627 4.5 g - 200 mL/hr (over 30 min) New Bag      1657 100 mL  Stopped      2119 4.5 g - 200 mL/hr (over 30 min) New Bag      2149  (over 30 min) Stopped     06/14/24  0426 4.5 g - 200 mL/hr (over 30 min) New Bag      0456  (over 30 min) Stopped               azithromycin (Zithromax) tablet 500 mg (mg) Total dose:  500 mg Dosing weight:  92.5      Date/Time Rate/Dose/Volume Action       06/13/24  1048 500 mg Given               losartan 100 mg, hydroCHLOROthiazide 25 mg for Hyzaar 100/25 Total volume:  Not documented* Dosing weight:  92.5   *Total volume has not been documented. View each administration to see the amount administered.     Date/Time Rate/Dose/Volume Action       06/13/24  1045  Given               acetaminophen (Tylenol) tablet 975 mg (mg) Total dose:  2,925 mg Dosing weight:  92.5      Date/Time Rate/Dose/Volume Action       06/13/24  0911 975 mg Given      1627 975 mg Given     06/14/24  0007 975 mg Given               ipratropium-albuteroL (Duo-Neb) 0.5-2.5 mg/3 mL nebulizer solution 3 mL (mL) Total volume:  9 mL Dosing weight:  98.5      Date/Time Rate/Dose/Volume Action       06/13/24  1017 3 mL Given      1500 3 mL Given      2224 3 mL Given     06/14/24  0900 *3 mL Missed               albuterol 2.5 mg /3 mL (0.083 %) nebulizer solution 2.5 mg (mg) Total dose:  2.5 mg Dosing weight:  98.5      Date/Time Rate/Dose/Volume Action       06/13/24  2242 2.5 mg Given               iohexol (OMNIPaque) 350 mg iodine/mL solution 80 mL (mL) Total volume:  80 mL Dosing weight:  98.5      Date/Time Rate/Dose/Volume Action       06/13/24  1439 80 mL Given               lactated Ringer's infusion (mL/hr) Total volume:  935 mL* Dosing weight:  98.5   *From user-documented volume     Date/Time Rate/Dose/Volume Action       06/13/24  1722 100 mL/hr New Bag     06/14/24  0228  Stopped      0229 100 mL/hr New Bag      0244 100 mL/hr - 935 mL Rate Verify               fentaNYL PF (Sublimaze) injection (mcg) Total dose:  50 mcg      Date/Time Rate/Dose/Volume Action        06/14/24  1006 50 mcg Given               midazolam (Versed) injection (mg) Total dose:  1 mg      Date/Time Rate/Dose/Volume Action       06/14/24  1006 1 mg Given                   Fluid sample was sent to the lab for culture.     See detailed result report with images in PACS.    The patient tolerated the procedure well without incident or complication.

## 2024-06-14 NOTE — SIGNIFICANT EVENT
06/14/24 1600   Onset Documentation   Rapid Response Initiated By Radar auto page   Location/Room [facility]  (LK 6016-A)   Pager Time [phone]   Arrival Time 1600   Event End Time 1605   Level II Called No   Primary Reason for Call Radar auto page     Rapid Response Note    Radar auto-page received for a radar score of 7 with the following vital signs: 36.0, 95, 22, 141/70, 94%.  Vital signs were confirmed and reviewed with primary RN.  There have been no acute changes.  There are no indications for interventions by Rapid Response at this time.  RN to contact Rapid Response with any future concerns or signs of clinical decompensation.

## 2024-06-14 NOTE — PROGRESS NOTES
"Marylu Amor is a 78 y.o. female on day 1 of admission presenting with Empyema (Multi).    Subjective   Sitting up at bedside leaning over table.  Reports that since chest tube was placed breathing has shown some mild improvement.     Objective     General: Lying in bed without distress.  Cooperative.  Skin: No rashes or ulcerations.  HEENT: Sclera is white.  Mucous membranes dry.  Cardiac: Regular rate and rhythm, S1/S2 normal.  Lungs: Decreased airflow mildly bilaterally, mild wheezing bilateral, mild soft crackles L>R, appears mildly short of breath with talking.  Abdomen: Soft, nontender, moderately obese abdomen, BS +  Extremities: No cyanosis.  No lower extremity edema.  Neurologic: Alert and oriented x3.  No focal deficits.  Psychiatric: Appropriate mood and behavior.  Currently no agitation.    Last Recorded Vitals  Blood pressure 143/79, pulse 106, temperature 36.3 °C (97.3 °F), temperature source Temporal, resp. rate 24, height 1.575 m (5' 2.01\"), weight 98.5 kg (217 lb 3.2 oz), SpO2 93%.  6 L O2 nasal cannula.    Intake/Output last 3 Shifts:  I/O last 3 completed shifts:  In: 1135 (11.5 mL/kg) [I.V.:935 (9.5 mL/kg); IV Piggyback:200]  Out: - (0 mL/kg)   Weight: 98.5 kg     Relevant Results  acetaminophen, 975 mg, oral, q8h  alteplase (Activase) 10 mg in sodium chloride 0.9% 50 mL syringe, 10 mg, intrapleural, Once  azithromycin, 500 mg, oral, q24h WILBERTO  dornase Alpha (Pulmozyme) 5 mg in sodium chloride 0.9% 50 mL syringe, 5 mg, intrapleural, Once  [Held by provider] enoxaparin, 40 mg, subcutaneous, q24h  ipratropium-albuteroL, 3 mL, nebulization, TID  losartan 100 mg, hydroCHLOROthiazide 25 mg for Hyzaar 100/25, , oral, Daily  oxygen, , inhalation, Continuous - Inhalation  piperacillin-tazobactam, 4.5 g, intravenous, q6h  sennosides-docusate sodium, 2 tablet, oral, BID  sodium chloride 0.9 % 60 mL intrapleural syringe, , intrapleural, Once       lactated Ringer's, 100 mL/hr, Last Rate: 100 mL/hr (06/14/24 " 4324)       PRN medications: albuterol, benzonatate, hydrOXYzine HCL, ondansetron **OR** ondansetron     Results for orders placed or performed during the hospital encounter of 06/13/24 (from the past 24 hour(s))   CBC   Result Value Ref Range    WBC 18.2 (H) 4.4 - 11.3 x10*3/uL    nRBC 0.0 0.0 - 0.0 /100 WBCs    RBC 3.87 (L) 4.00 - 5.20 x10*6/uL    Hemoglobin 10.9 (L) 12.0 - 16.0 g/dL    Hematocrit 32.2 (L) 36.0 - 46.0 %    MCV 83 80 - 100 fL    MCH 28.2 26.0 - 34.0 pg    MCHC 33.9 32.0 - 36.0 g/dL    RDW 13.9 11.5 - 14.5 %    Platelets 446 150 - 450 x10*3/uL   Basic Metabolic Panel   Result Value Ref Range    Glucose 118 (H) 74 - 99 mg/dL    Sodium 135 (L) 136 - 145 mmol/L    Potassium 3.8 3.5 - 5.3 mmol/L    Chloride 98 98 - 107 mmol/L    Bicarbonate 26 21 - 32 mmol/L    Anion Gap 15 10 - 20 mmol/L    Urea Nitrogen 19 6 - 23 mg/dL    Creatinine 0.73 0.50 - 1.05 mg/dL    eGFR 85 >60 mL/min/1.73m*2    Calcium 9.1 8.6 - 10.6 mg/dL     Hospital Course:  Ms John is a 78-year-old female with PMHx: HTN who presented to [facility] from [facility].  Patient states she had had a moist productive cough for the past 3 weeks it has been worsening states it been hurting to cough and she been waking up in the middle of the night diaphoretic.  She went to the urgent care in [place] was found to have a loculated pneumonia and was transferred to [facility] where they started her on IV antibiotics and she was then transferred to [facility] for further care.  Patient was started on azithromycin, Zosyn.  Thoracic surgery consulted.  Repeat CT chest done shows large multiloculated pleural effusion.  Thoracic surgery recommended interventional radiology for chest tube placement which patient received on 6/14.     Assessment/Plan     Acute hypoxemic respiratory failure  Community-acquired pneumonia with multiloculated pleural effusion  -Repeat CT chest shows multiloculated pleural effusion with noted atelectasis.  -WBC at SW  19.0, 18.9 on initial admission here.  Today down to 18.2.  -MRSA nares swab  -Pigtail chest tube placed by interventional radiology on 6/14.  -Continue IV Zosyn and azithromycin for now.  -Continue DuoNeb and albuterol as needed.  -Blood culture x 1 ordered.  -Hydroxizine prn for anxiety  -Wean oxygen as tolerated, currently on 6 L O2 nasal cannula, wean O2 to keep pulse ox goal 92% or higher.  -Monitor leukocytosis.     HTN  -c/w home Losartan/hydrochlorothiazide.  -Initially uncontrolled but likely due to distress, today blood pressure in the 140s.  Continue to monitor.    Disposition: Monitor leukocytosis, wean oxygen as tolerated, await further input from thoracic surgery with chest tube management.    Johnny Arechiga MD

## 2024-06-15 ENCOUNTER — APPOINTMENT (OUTPATIENT)
Dept: RADIOLOGY | Facility: HOSPITAL | Age: 79
End: 2024-06-15
Payer: MEDICARE

## 2024-06-15 PROCEDURE — 2500000001 HC RX 250 WO HCPCS SELF ADMINISTERED DRUGS (ALT 637 FOR MEDICARE OP): Performed by: NURSE PRACTITIONER

## 2024-06-15 PROCEDURE — 99233 SBSQ HOSP IP/OBS HIGH 50: CPT | Performed by: THORACIC SURGERY (CARDIOTHORACIC VASCULAR SURGERY)

## 2024-06-15 PROCEDURE — 2500000005 HC RX 250 GENERAL PHARMACY W/O HCPCS: Performed by: STUDENT IN AN ORGANIZED HEALTH CARE EDUCATION/TRAINING PROGRAM

## 2024-06-15 PROCEDURE — 71045 X-RAY EXAM CHEST 1 VIEW: CPT | Performed by: RADIOLOGY

## 2024-06-15 PROCEDURE — 71045 X-RAY EXAM CHEST 1 VIEW: CPT

## 2024-06-15 PROCEDURE — 2500000004 HC RX 250 GENERAL PHARMACY W/ HCPCS (ALT 636 FOR OP/ED): Performed by: NURSE PRACTITIONER

## 2024-06-15 PROCEDURE — 1210000001 HC SEMI-PRIVATE ROOM DAILY

## 2024-06-15 PROCEDURE — 99232 SBSQ HOSP IP/OBS MODERATE 35: CPT | Performed by: STUDENT IN AN ORGANIZED HEALTH CARE EDUCATION/TRAINING PROGRAM

## 2024-06-15 ASSESSMENT — COGNITIVE AND FUNCTIONAL STATUS - GENERAL
HELP NEEDED FOR BATHING: A LITTLE
TOILETING: A LITTLE
MOVING TO AND FROM BED TO CHAIR: A LITTLE
MOVING FROM LYING ON BACK TO SITTING ON SIDE OF FLAT BED WITH BEDRAILS: A LITTLE
STANDING UP FROM CHAIR USING ARMS: A LITTLE
CLIMB 3 TO 5 STEPS WITH RAILING: A LOT
TURNING FROM BACK TO SIDE WHILE IN FLAT BAD: A LITTLE
DRESSING REGULAR LOWER BODY CLOTHING: A LITTLE
DRESSING REGULAR UPPER BODY CLOTHING: A LITTLE
DAILY ACTIVITIY SCORE: 20
MOBILITY SCORE: 16
WALKING IN HOSPITAL ROOM: A LOT

## 2024-06-15 ASSESSMENT — PAIN SCALES - GENERAL
PAINLEVEL_OUTOF10: 0 - NO PAIN
PAINLEVEL_OUTOF10: 0 - NO PAIN

## 2024-06-15 NOTE — NURSING NOTE
Released clamp post lytics insertion @ 1745. Marked drainage system at the time clamp was released. Spoke with thoracic team member bedside who stated they will follow up in an hour. Meds were not scanned but were inserted by team thoracic team member. Paged team and informed them that they are responsible for documenting administration.

## 2024-06-15 NOTE — PROGRESS NOTES
"Marylu Amor is a 78 y.o. female on hospital day 2 of admission presenting with Empyema (Multi).    Subjective     The patient was seen and examined at bedside. Had an episode of severe pain yesterday evening, but has subsided since. Tolerating lytic therapy.    Objective     GENERAL APPEARANCE: A&Ox3, appears in no acute distress  HEAD: normocephalic, atraumatic  THROAT: Oral cavity and pharynx normal. No inflammation, swelling, exudate, or lesions.  NECK: Neck supple, non-tender without lymphadenopathy, masses or thyromegaly.  CARDIAC: Normal S1 and S2. No S3, S4 or murmurs. Rhythm is regular. There is no peripheral edema, cyanosis or pallor. Extremities are warm and well perfused. No carotid bruits.  LUNGS: Clear to auscultation bilaterally without rales, rhonchi, wheezing or diminished breath sounds.  ABDOMEN: Positive bowel sounds. Soft, nondistended, nontender. No guarding or rebound. No masses.  EXTREMITIES: No significant deformity or joint abnormality. No edema. Peripheral pulses intact. No varicosities.  SKIN: Skin normal color, texture and turgor with no lesions or rash  PSYCHIATRIC: oriented to person, place, and time, good judgement and reason, without hallucinations, abnormal affect or abnormal behaviors during the examination. Patient is not suicidal.        Last Recorded Vitals  Blood pressure 164/75, pulse 109, temperature 36.5 °C (97.7 °F), temperature source Temporal, resp. rate 22, height 1.575 m (5' 2.01\"), weight 98.5 kg (217 lb 3.2 oz), SpO2 94%.  Intake/Output last 3 Shifts:  I/O last 3 completed shifts:  In: 1175 (11.9 mL/kg) [P.O.:240; I.V.:935 (9.5 mL/kg)]  Out: 940 (9.5 mL/kg) [Drains:940]  Weight: 98.5 kg     Relevant Results  Lab Results   Component Value Date    WBC 18.2 (H) 06/14/2024    HGB 10.9 (L) 06/14/2024    HCT 32.2 (L) 06/14/2024    MCV 83 06/14/2024     06/14/2024      Lab Results   Component Value Date    GLUCOSE 118 (H) 06/14/2024    CALCIUM 9.1 06/14/2024     " (L) 06/14/2024    K 3.8 06/14/2024    CO2 26 06/14/2024    CL 98 06/14/2024    BUN 19 06/14/2024    CREATININE 0.73 06/14/2024     Scheduled medications  acetaminophen, 650 mg, oral, 4x daily  alteplase (Activase) 10 mg in sodium chloride 0.9% 50 mL syringe, 10 mg, intrapleural, Once  azithromycin, 500 mg, oral, q24h WILBERTO  dornase Alpha (Pulmozyme) 5 mg in sodium chloride 0.9% 50 mL syringe, 5 mg, intrapleural, Once  [Held by provider] enoxaparin, 40 mg, subcutaneous, q24h  ipratropium-albuteroL, 3 mL, nebulization, TID  lidocaine, 1 patch, transdermal, Daily  losartan 100 mg, hydroCHLOROthiazide 25 mg for Hyzaar 100/25, , oral, Daily  oxygen, , inhalation, Continuous - Inhalation  piperacillin-tazobactam, 4.5 g, intravenous, q6h  sennosides-docusate sodium, 2 tablet, oral, BID  sodium chloride 0.9 % 60 mL intrapleural syringe, , intrapleural, Once      Continuous medications  lactated Ringer's, 100 mL/hr, Last Rate: 100 mL/hr (06/15/24 0209)      PRN medications  PRN medications: albuterol, benzocaine-menthol, benzonatate, HYDROmorphone, hydrOXYzine HCL, ondansetron **OR** ondansetron, oxyCODONE, oxyCODONE    Assessment/Plan     Acute hypoxemic respiratory failure  Community-acquired pneumonia with multiloculated pleural effusion  - Repeat CT chest shows multiloculated pleural effusion with noted atelectasis.  - WBC at SW 19.0, 18.9 on initial admission here.   - MRSA nares swab  - Pigtail chest tube placed by interventional radiology on 6/14.  - Continue IV Zosyn and azithromycin for now.  - Continue DuoNeb and albuterol as needed.  - Blood culture x 1 ordered.  - Hydroxizine prn for anxiety  - Wean oxygen as tolerated, currently on 6 L O2 nasal cannula, wean O2 to keep pulse ox goal 92% or higher, will wean this morning as O2 at 97%  - Monitor leukocytosis.  - Pulmonology following, will cont. Lytic therapy BID. Keep IR pigtail at -20 suction     HTN  - c/w home Losartan/hydrochlorothiazide.  - Initially  uncontrolled but likely due to distress, today blood pressure in the 140s.  Continue to monitor.     Disposition: Monitor leukocytosis, wean oxygen as tolerated, pigtail at -20 suction, will cont. BID lytic therapy at this time    Sixto Aguilar MD     The patient encounter includes all but not limited to; Evaluation of laboratory results, pertinent imaging, and vital signs. Daily updates are discussed with any consulting services and family/medical power of  as needed. The patient's discharge  process begins at admission and daily contact with the patient's TCC and SW is pertinent in their efficient and safe discharge.

## 2024-06-15 NOTE — SIGNIFICANT EVENT
Administration of Alteplase (t-PA)/Dornase Roshan (DNase): 10mg of Alteplase in 50 ml of sterile saline in a luer-lock syringe. 5mg of DNase prepared in a separate 50ml of sterile saline in a separate luer-lock syringe. The preparations were administered using sterile technique and the tube was clamped/ 3- way stop cock closed. The pigtail remained clamped for 1 hour.  After 1 hour, it was removed, and maintained to -20cm suction. Please record the chest tube output every shift and to monitor the patient for chest discomfort, hypoxemia, hemodynamic instability, or bloody chest tube output >200cc/hr for 4 hours or >1000 cc upon opening the stopcock. Will obtain a morning CXR to reassess the patient after the procedure.

## 2024-06-15 NOTE — CARE PLAN
Problem: Respiratory  Goal: Minimize anxiety/maximize coping throughout shift  Outcome: Progressing  Goal: Minimal/no exertional discomfort or dyspnea this shift  Outcome: Progressing  Goal: No signs of respiratory distress (eg. Use of accessory muscles. Peds grunting)  Outcome: Progressing  Goal: Tolerate pulmonary toileting this shift  Outcome: Progressing  Goal: Increase self care and/or family involvement in next 24 hours  Outcome: Progressing     Problem: Fall/Injury  Goal: Pace activities to prevent fatigue by end of the shift  Outcome: Progressing     Problem: Safety - Adult  Goal: Free from fall injury  Outcome: Met      The clinical goals for the shift include Saba will tolerate wean o2 this shift.

## 2024-06-15 NOTE — CARE PLAN
The patient's goals for the shift include      The clinical goals for the shift include pt safety will be maintain throughout this shift    Problem: Fall/Injury  Goal: Pace activities to prevent fatigue by end of the shift  Outcome: Progressing     Problem: Respiratory  Goal: Minimize anxiety/maximize coping throughout shift  Outcome: Progressing  Goal: Minimal/no exertional discomfort or dyspnea this shift  Outcome: Progressing  Goal: No signs of respiratory distress (eg. Use of accessory muscles. Peds grunting)  Outcome: Progressing     Problem: Skin  Goal: Prevent/manage excess moisture  Outcome: Progressing  Flowsheets (Taken 6/15/2024 1435)  Prevent/manage excess moisture: Moisturize dry skin  Goal: Promote skin healing  Outcome: Progressing  Flowsheets (Taken 6/15/2024 1435)  Promote skin healing: Assess skin/pad under line(s)/device(s)

## 2024-06-15 NOTE — SIGNIFICANT EVENT
06/15/24 1720   Onset Documentation   Rapid Response Initiated By Radar auto page   Location/Room [facility] [place] [place]  (LK 6016-A)   Pager Time 1715   Arrival Time 1720   Event End Time 1725   Level II Called No   Primary Reason for Call Radar auto page     Rapid Response Note    Radar auto-page received for a radar score of 6 with the following vital signs: 36.5, 109, 22, 164/75, 94%.  Vital signs were confirmed and reviewed with primary RN.  She is at her current baseline.  There are no indications for interventions by Rapid Response at this time.  RN to contact Rapid Response with any future concerns or signs of clinical decompensation.

## 2024-06-15 NOTE — PROGRESS NOTES
"Marylu Amor is a 78 y.o. female on day 2 of admission presenting with Empyema (Multi).    Subjective   No acute events overnight. Patient reports pain with lytic therapy yesterday. 980 ml of serosanguinous fluid from chest tube.        Objective     Physical Exam  Constitutional: NAD  Neuro: A/O x4, no gross deficits   Psych: normal affect  HEENT: No deformities, no scleral icterus   Cardiac: RRR  Pulmonary: labored tachypneic respirations on 5L , pigtail in place with 980 ml of serosang fluid   Abdomen: soft, non distended, non tender  Skin: warm and dry overall    Extremities: no swelling noted  MSK: moving all four     Last Recorded Vitals  Blood pressure 144/72, pulse 89, temperature 36.5 °C (97.7 °F), temperature source Temporal, resp. rate 17, height 1.575 m (5' 2.01\"), weight 98.5 kg (217 lb 3.2 oz), SpO2 95%.  Intake/Output last 3 Shifts:  I/O last 3 completed shifts:  In: 1175 (11.9 mL/kg) [P.O.:240; I.V.:935 (9.5 mL/kg)]  Out: 940 (9.5 mL/kg) [Drains:940]  Weight: 98.5 kg     Relevant Results  XR chest 1 view    Result Date: 6/15/2024  Interpreted By:  Giovani García, STUDY: XR CHEST 1 VIEW; 6/15/2024 7:55 am   INDICATION: Signs/Symptoms:AM upright.   COMPARISON: 06/13/2024.   ACCESSION NUMBER(S): [id number]   ORDERING CLINICIAN: ABBEY JORGE   FINDINGS: Left-sided pigtail in place.   CARDIOMEDIASTINAL SILHOUETTE: Cardiomegaly versus pericardial effusion.   LUNGS: Minimal improvement in loculated left-sided pleural effusion. Right basilar atelectasis with low lung volumes.   ABDOMEN: No remarkable upper abdominal findings.   BONES: No acute osseous changes.       1.  Minimal improvement in loculated left-sided pleural effusion status post pigtail catheter. Pneumothorax.     Signed by: Giovani García 6/15/2024 8:43 AM Dictation workstation:   [id number]    US thoracentesis w insertion of tube includes water seal when performed    Result Date: 6/15/2024  Interpreted By:  Bharati Doan,  and " [facility] STUDY:  THORACENTESIS WITH INSERTION OF TUBE INCLUDES WATER SEAL WHEN PERFORMED 6/14/2024 10:34 am   INDICATION: Signs/Symptoms:Large legft pleural effusion with hypoxic respiratory failure   COMPARISON: CT chest on 06/13/2024   ACCESSION NUMBER(S): [id number]   ORDERING CLINICIAN: LEN RAMIREZ   TECHNIQUE: INTERVENTIONALIST(S): Bharati Doan MD   CONSENT: The patient was informed of the nature of the proposed procedure. The purposes, alternatives, risks, and benefits were explained and discussed. All questions were answered and consent was obtained.   SEDATION: Moderate conscious IV sedation services (supervision of administration, induction, and maintenance) were provided by the physician performing the procedure with intravenous fentanyl 50mcg and versed 1mg for 15 minutes the physician was assisted by an independent trained observer, an interventional radiology nurse, in the continuous monitoring of patient level of consciousness and physiologic status.   MEDICATION/CONTRAST: No additional   TIME OUT: A time out was performed immediately prior to procedure start with the interventional team, correctly identifying the patient name, date of birth, MRN, procedure, anatomy (including marking of site and side), patient position, procedure consent form, relevant laboratory and imaging test results, antibiotic administration, safety precautions, and procedure-specific equipment needs.   COMPLICATIONS: No immediate adverse events identified.   FINDINGS: The patient was placed in the upright supine position.   The left pleural space was examined with grey scale ultrasound, and the most accessible fluid identified and marked for left-sided chest tube placement   The skin was prepped and draped in usual manner. 1% lidocaine was injected subcutaneously and then into the deeper tissues surrounding the target area of for chest tube placement. A 12 [place] One-Step pigtail drainage catheter was then slowly  advanced into the pleural space, and the stylet was removed. Approximately 40 mL of serosanguineous/purulent colored fluid was removed and sent for fluid studies.   The pigtail drainage catheter was then connected to a pleur-evac, which collected an additional 70 mL of serosanguineous colored fluid.   The patient tolerated the procedure well and there were no immediate complications. Specimen(s) sent to the laboratory for culture.       Uneventful 12 [place] left chest tube placement. The chest tube was connected to a Pleur-evac. Please connect the Pleur-evac to continuous wall suction.   I was present for and/or performed the critical portions of the procedure and immediately available throughout the entire procedure.     I personally reviewed the images/study and I agree with radiology resident Dr. Miriam Fournier's findings as stated. This study was interpreted at [place], [place].   MACRO: None.   Signed by: Bharati Doan 6/15/2024 7:45 AM Dictation workstation:   [id number]    CT chest w IV contrast    Result Date: 6/13/2024  Interpreted By:  Yosef Warner and Hofer Lindsay STUDY: CT CHEST W IV CONTRAST;  6/13/2024 2:38 pm   INDICATION: Signs/Symptoms:Hypoxia pneumonia concern for empyema.   COMPARISON: None.   ACCESSION NUMBER(S): [id number]   ORDERING CLINICIAN: HUNTER AGRAWAL   TECHNIQUE: Helical data acquisition of the chest was obtained following intravenous administration of 80 MLOmnipaque 350.  Images were reformatted in axial, coronal, and sagittal planes.   FINDINGS: LUNGS AND AIRWAYS: The trachea and central airways are patent. No endobronchial lesion is seen.   Large multiloculated left pleural effusion with adjacent atelectasis. Trace right pleural effusion. Areas of consolidation within the right middle lobe most likely represent atelectasis. Additional dependent atelectasis in the right lower lobe. Small amount of atelectasis throughout the right lung.  No focal pulmonary nodules within the visualized lung zones.     MEDIASTINUM AND MARY ANNE, LOWER NECK AND AXILLA: The visualized thyroid gland is within normal limits. No evidence of thoracic lymphadenopathy by CT criteria. Few small subcentimeter sized mediastinal lymph nodes are felt to be reactive in nature. Esophagus appears within normal limits as seen.   HEART AND VESSELS: The thoracic aorta normal in course and caliber.There are mild scattered atherosclerosis present, including calcified and noncalcified plaques. Main pulmonary artery and its branches are normal in caliber. No coronary artery calcifications are seen. Please note, the study is not optimized for evaluation of coronary arteries. The cardiac chambers are not enlarged. There is no pericardial effusion seen.   UPPER ABDOMEN: The visualized subdiaphragmatic structures demonstrate no remarkable findings.       CHEST WALL AND OSSEOUS STRUCTURES: Chest wall is within normal limits. No acute osseous pathology.There are no suspicious osseous lesions.Degenerative changes of the spine.       1.  Large multiloculated left pleural effusion with adjacent atelectasis. The sterility of this fluid collection is unable to be assessed. No evidence of pleural thickening/enhancement. Recommend follow-up chest CT 4-6 weeks after appropriate treatment to evaluate for resolution. 2. Trace right pleural effusion with right middle lobe and lower lobe opacities favored to represent atelectasis.   I personally reviewed the images/study and resident's interpretation and I agree with the findings as stated by Hazel Cohen MD (resident radiologist). This study was analyzed and interpreted at [facility], [place], [place].   MACRO: Critical Finding:  See findings. Notification was initiated on 6/13/2024 at 4:11 pm by  Yosef Warner.  (**-YCF-**) Instructions:   Signed by: Yosef Warner 6/13/2024 4:11 PM Dictation workstation:   [id number]    XR  chest 1 view    Result Date: 6/13/2024  Interpreted By:  Lillian Tobar and Hofer Lindsay STUDY: XR CHEST 1 VIEW;  6/13/2024 9:01 am   INDICATION: Signs/Symptoms:increased work of breathing, hypoxia.   COMPARISON: None.   ACCESSION NUMBER(S): [id number]   ORDERING CLINICIAN: HUNTER AGRAWAL   FINDINGS: AP radiograph of the chest was provided.     CARDIOMEDIASTINAL SILHOUETTE: The cardiomediastinal silhouette is obscured by an overlying left lung opacity.   LUNGS: Dense consolidation of the left mid to lower lung blunting of the left costophrenic angle. Slight blunting of the right costophrenic angle. No pneumothorax.   ABDOMEN: No remarkable upper abdominal findings.   BONES: No acute osseous changes.       1.  Dense consolidation of the left mid to lower lung concerning for large pleural effusion and/or infectious infiltrate. 2. Small right pleural effusion.   I personally reviewed the images/study and resident's interpretation and I agree with the findings as stated by Hazel Cohen MD (resident radiologist). This study was analyzed and interpreted at [place], [place].   MACRO: None   Signed by: Lillian Tobar 6/13/2024 9:49 AM Dictation workstation:   [id number]     Results for orders placed or performed during the hospital encounter of 06/13/24 (from the past 24 hour(s))   Blood Culture    Specimen: Peripheral Venipuncture; Blood culture   Result Value Ref Range    Blood Culture Loaded on Instrument - Culture in progress          Assessment/Plan   Principal Problem:    Empyema (Multi)  Active Problems:    Pneumonia of left lower lobe due to infectious organism    Marylu Amor is a 78 y.o. female with hx of HTN and remote smoking hx (50 years ago, 2 py) has had cough for past 3 weeks with chest pain and diaphoresis. Seen at [facility] with leukocytosis 19.0 and CT showing rounded masslike component in LLL with cf neoplasm, admitted for treatment of  loculated lower lobe effusion. Repeat CT confirming large multiloculated left pleural effusion without thickening or enhancement, no mass noted.       -lytic therapy through IR pigtail BID    -keep IR pigtail to -20 sxn   -consider repeat CT Monday         Discussed with Dr. Nicola Ko, DO       (3) no apparent problem

## 2024-06-16 ENCOUNTER — APPOINTMENT (OUTPATIENT)
Dept: RADIOLOGY | Facility: HOSPITAL | Age: 79
End: 2024-06-16
Payer: MEDICARE

## 2024-06-16 VITALS
HEIGHT: 62 IN | BODY MASS INDEX: 39.97 KG/M2 | HEART RATE: 94 BPM | SYSTOLIC BLOOD PRESSURE: 164 MMHG | OXYGEN SATURATION: 96 % | WEIGHT: 217.2 LBS | RESPIRATION RATE: 16 BRPM | DIASTOLIC BLOOD PRESSURE: 79 MMHG | TEMPERATURE: 97.9 F

## 2024-06-16 LAB
BACTERIA BLD CULT: NORMAL
BACTERIA FLD CULT: NORMAL
ERYTHROCYTE [DISTWIDTH] IN BLOOD BY AUTOMATED COUNT: 14.2 % (ref 11.5–14.5)
GRAM STN SPEC: NORMAL
GRAM STN SPEC: NORMAL
HCT VFR BLD AUTO: 34.3 % (ref 36–46)
HGB BLD-MCNC: 10.7 G/DL (ref 12–16)
MCH RBC QN AUTO: 28.4 PG (ref 26–34)
MCHC RBC AUTO-ENTMCNC: 31.2 G/DL (ref 32–36)
MCV RBC AUTO: 91 FL (ref 80–100)
NRBC BLD-RTO: 0 /100 WBCS (ref 0–0)
PLATELET # BLD AUTO: 419 X10*3/UL (ref 150–450)
RBC # BLD AUTO: 3.77 X10*6/UL (ref 4–5.2)
WBC # BLD AUTO: 11.8 X10*3/UL (ref 4.4–11.3)

## 2024-06-16 PROCEDURE — 1210000001 HC SEMI-PRIVATE ROOM DAILY

## 2024-06-16 PROCEDURE — 99232 SBSQ HOSP IP/OBS MODERATE 35: CPT | Performed by: INTERNAL MEDICINE

## 2024-06-16 PROCEDURE — 2500000005 HC RX 250 GENERAL PHARMACY W/O HCPCS: Performed by: STUDENT IN AN ORGANIZED HEALTH CARE EDUCATION/TRAINING PROGRAM

## 2024-06-16 PROCEDURE — 71045 X-RAY EXAM CHEST 1 VIEW: CPT | Performed by: RADIOLOGY

## 2024-06-16 PROCEDURE — 36415 COLL VENOUS BLD VENIPUNCTURE: CPT | Performed by: STUDENT IN AN ORGANIZED HEALTH CARE EDUCATION/TRAINING PROGRAM

## 2024-06-16 PROCEDURE — 2500000004 HC RX 250 GENERAL PHARMACY W/ HCPCS (ALT 636 FOR OP/ED): Performed by: NURSE PRACTITIONER

## 2024-06-16 PROCEDURE — 99233 SBSQ HOSP IP/OBS HIGH 50: CPT | Performed by: THORACIC SURGERY (CARDIOTHORACIC VASCULAR SURGERY)

## 2024-06-16 PROCEDURE — 85027 COMPLETE CBC AUTOMATED: CPT | Performed by: STUDENT IN AN ORGANIZED HEALTH CARE EDUCATION/TRAINING PROGRAM

## 2024-06-16 PROCEDURE — 2500000001 HC RX 250 WO HCPCS SELF ADMINISTERED DRUGS (ALT 637 FOR MEDICARE OP): Performed by: NURSE PRACTITIONER

## 2024-06-16 PROCEDURE — 71045 X-RAY EXAM CHEST 1 VIEW: CPT

## 2024-06-16 ASSESSMENT — COGNITIVE AND FUNCTIONAL STATUS - GENERAL
DRESSING REGULAR UPPER BODY CLOTHING: A LITTLE
TOILETING: A LITTLE
TURNING FROM BACK TO SIDE WHILE IN FLAT BAD: A LITTLE
MOVING FROM LYING ON BACK TO SITTING ON SIDE OF FLAT BED WITH BEDRAILS: A LITTLE
DAILY ACTIVITIY SCORE: 20
MOBILITY SCORE: 16
HELP NEEDED FOR BATHING: A LITTLE
STANDING UP FROM CHAIR USING ARMS: A LITTLE
MOVING TO AND FROM BED TO CHAIR: A LITTLE
CLIMB 3 TO 5 STEPS WITH RAILING: A LOT
WALKING IN HOSPITAL ROOM: A LOT
DRESSING REGULAR LOWER BODY CLOTHING: A LITTLE

## 2024-06-16 ASSESSMENT — PAIN - FUNCTIONAL ASSESSMENT: PAIN_FUNCTIONAL_ASSESSMENT: 0-10

## 2024-06-16 ASSESSMENT — PAIN SCALES - GENERAL
PAINLEVEL_OUTOF10: 0 - NO PAIN
PAINLEVEL_OUTOF10: 0 - NO PAIN

## 2024-06-16 NOTE — CARE PLAN
The patient's goals for the shift include      The clinical goals for the shift include pt safety will be maintain throughout this shift    Over the shift, the patient did not make progress toward the following goals. Barriers to progression include   Problem: Fall/Injury  Goal: Use assistive devices by end of the shift  Outcome: Progressing  Goal: Pace activities to prevent fatigue by end of the shift  Outcome: Progressing     Problem: Respiratory  Goal: Minimize anxiety/maximize coping throughout shift  Outcome: Progressing  Goal: Minimal/no exertional discomfort or dyspnea this shift  Outcome: Progressing  Goal: No signs of respiratory distress (eg. Use of accessory muscles. Peds grunting)  Outcome: Progressing  Goal: Tolerate pulmonary toileting this shift  Outcome: Progressing  Goal: Verbalize decreased shortness of breath this shift  Outcome: Progressing  Goal: Increase self care and/or family involvement in next 24 hours  Outcome: Progressing     Problem: Pain - Adult  Goal: Verbalizes/displays adequate comfort level or baseline comfort level  Outcome: Progressing     Problem: Discharge Planning  Goal: Discharge to home or other facility with appropriate resources  Outcome: Progressing     Problem: Chronic Conditions and Co-morbidities  Goal: Patient's chronic conditions and co-morbidity symptoms are monitored and maintained or improved  Outcome: Progressing     Problem: Skin  Goal: Decreased wound size/increased tissue granulation at next dressing change  Outcome: Progressing  Goal: Participates in plan/prevention/treatment measures  Outcome: Progressing  Goal: Prevent/manage excess moisture  Outcome: Progressing  Goal: Prevent/minimize sheer/friction injuries  Outcome: Progressing  Goal: Promote/optimize nutrition  Outcome: Progressing  Goal: Promote skin healing  Outcome: Progressing   . Recommendations to address these barriers include

## 2024-06-16 NOTE — PROGRESS NOTES
"Marylu Amor is a 78 y.o. female on day 3 of admission presenting with Empyema (Multi).    Subjective   No acute events overnight. No lytic therapy yesterday due to clogged tube., this AM tube was unclogged however was almost out of the patient's chest. She has no complaints this morning.        Objective     Physical Exam  Constitutional: NAD  Neuro: A/O x4, no gross deficits   Psych: normal affect  HEENT: No deformities, no scleral icterus   Cardiac: RRR  Pulmonary: labored tachypneic respirations on 5L , pigtail in place with 70 ml of serosang fluid. Pigtail is almost out of patient's chest   Abdomen: soft, non distended, non tender  Skin: warm and dry overall    Extremities: no swelling noted  MSK: moving all four  Last Recorded Vitals  Blood pressure 134/89, pulse 84, temperature 36.4 °C (97.5 °F), resp. rate 16, height 1.575 m (5' 2.01\"), weight 98.5 kg (217 lb 3.2 oz), SpO2 96%.  Intake/Output last 3 Shifts:  I/O last 3 completed shifts:  In: 480 (4.9 mL/kg) [P.O.:480]  Out: 920 (9.3 mL/kg) [Drains:920]  Weight: 98.5 kg     Relevant Results                    XR chest 1 view    Result Date: 6/16/2024  Interpreted By:  Giovani García, STUDY: XR CHEST 1 VIEW; 6/16/2024 8:30 am   INDICATION: Signs/Symptoms:Daily to assess pleural effusion/ empyema and pigtail.   COMPARISON: 06/15/2024.   ACCESSION NUMBER(S): [id number]   ORDERING CLINICIAN: SHALINI MC   FINDINGS: Left-sided pigtail catheter in place.   CARDIOMEDIASTINAL SILHOUETTE: Cardiomegaly versus pericardial effusion..   LUNGS: Slight interval improvement in left lung aeration with residual atelectasis/effusion. No pneumothorax.   ABDOMEN: No remarkable upper abdominal findings.   BONES: No acute osseous changes.       1.  Slight interval improvement in left-sided pleural effusion/loculation. Residual pleural effusion and left basilar chest tube.     Signed by: Giovani García 6/16/2024 1:30 PM Dictation workstation:   [id number]    XR chest 1 " view    Result Date: 6/15/2024  Interpreted By:  Giovani García, STUDY: XR CHEST 1 VIEW; 6/15/2024 7:55 am   INDICATION: Signs/Symptoms:AM upright.   COMPARISON: 06/13/2024.   ACCESSION NUMBER(S): [id number]   ORDERING CLINICIAN: ABBEY JORGE   FINDINGS: Left-sided pigtail in place.   CARDIOMEDIASTINAL SILHOUETTE: Cardiomegaly versus pericardial effusion.   LUNGS: Minimal improvement in loculated left-sided pleural effusion. Right basilar atelectasis with low lung volumes.   ABDOMEN: No remarkable upper abdominal findings.   BONES: No acute osseous changes.       1.  Minimal improvement in loculated left-sided pleural effusion status post pigtail catheter. Pneumothorax.     Signed by: Giovani García 6/15/2024 8:43 AM Dictation workstation:   [id number]    US thoracentesis w insertion of tube includes water seal when performed    Result Date: 6/15/2024  Interpreted By:  Bharati Doan and Bamfo Rose STUDY: US THORACENTESIS WITH INSERTION OF TUBE INCLUDES WATER SEAL WHEN PERFORMED 6/14/2024 10:34 am   INDICATION: Signs/Symptoms:Large legft pleural effusion with hypoxic respiratory failure   COMPARISON: CT chest on 06/13/2024   ACCESSION NUMBER(S): [id number]   ORDERING CLINICIAN: LEN RAMIREZ   TECHNIQUE: INTERVENTIONALIST(S): Bharati Doan MD   CONSENT: The patient was informed of the nature of the proposed procedure. The purposes, alternatives, risks, and benefits were explained and discussed. All questions were answered and consent was obtained.   SEDATION: Moderate conscious IV sedation services (supervision of administration, induction, and maintenance) were provided by the physician performing the procedure with intravenous fentanyl 50mcg and versed 1mg for 15 minutes the physician was assisted by an independent trained observer, an interventional radiology nurse, in the continuous monitoring of patient level of consciousness and physiologic status.   MEDICATION/CONTRAST: No additional   TIME OUT:  A time out was performed immediately prior to procedure start with the interventional team, correctly identifying the patient name, date of birth, MRN, procedure, anatomy (including marking of site and side), patient position, procedure consent form, relevant laboratory and imaging test results, antibiotic administration, safety precautions, and procedure-specific equipment needs.   COMPLICATIONS: No immediate adverse events identified.   FINDINGS: The patient was placed in the upright supine position.   The left pleural space was examined with grey scale ultrasound, and the most accessible fluid identified and marked for left-sided chest tube placement   The skin was prepped and draped in usual manner. 1% lidocaine was injected subcutaneously and then into the deeper tissues surrounding the target area of for chest tube placement. A 12 Azerbaijani One-Step pigtail drainage catheter was then slowly advanced into the pleural space, and the stylet was removed. Approximately 40 mL of serosanguineous/purulent colored fluid was removed and sent for fluid studies.   The pigtail drainage catheter was then connected to a pleur-evac, which collected an additional 70 mL of serosanguineous colored fluid.   The patient tolerated the procedure well and there were no immediate complications. Specimen(s) sent to the laboratory for culture.       Uneventful 12 Azerbaijani left chest tube placement. The chest tube was connected to a Pleur-evac. Please connect the Pleur-evac to continuous wall suction.   I was present for and/or performed the critical portions of the procedure and immediately available throughout the entire procedure.     I personally reviewed the images/study and I agree with radiology resident Dr. Miriam Fournier's findings as stated. This study was interpreted at [place], [place].   MACRO: None.   Signed by: Bharati Doan 6/15/2024 7:45 AM Dictation workstation:   [id number]    CT  chest w IV contrast    Result Date: 6/13/2024  Interpreted By:  Yosef Warner and Hofer Lindsay STUDY: CT CHEST W IV CONTRAST;  6/13/2024 2:38 pm   INDICATION: Signs/Symptoms:Hypoxia pneumonia concern for empyema.   COMPARISON: None.   ACCESSION NUMBER(S): [id number]   ORDERING CLINICIAN: HUNTER AGRAWAL   TECHNIQUE: Helical data acquisition of the chest was obtained following intravenous administration of 80 MLOmnipaque 350.  Images were reformatted in axial, coronal, and sagittal planes.   FINDINGS: LUNGS AND AIRWAYS: The trachea and central airways are patent. No endobronchial lesion is seen.   Large multiloculated left pleural effusion with adjacent atelectasis. Trace right pleural effusion. Areas of consolidation within the right middle lobe most likely represent atelectasis. Additional dependent atelectasis in the right lower lobe. Small amount of atelectasis throughout the right lung. No focal pulmonary nodules within the visualized lung zones.     MEDIASTINUM AND MARY ANNE, LOWER NECK AND AXILLA: The visualized thyroid gland is within normal limits. No evidence of thoracic lymphadenopathy by CT criteria. Few small subcentimeter sized mediastinal lymph nodes are felt to be reactive in nature. Esophagus appears within normal limits as seen.   HEART AND VESSELS: The thoracic aorta normal in course and caliber.There are mild scattered atherosclerosis present, including calcified and noncalcified plaques. Main pulmonary artery and its branches are normal in caliber. No coronary artery calcifications are seen. Please note, the study is not optimized for evaluation of coronary arteries. The cardiac chambers are not enlarged. There is no pericardial effusion seen.   UPPER ABDOMEN: The visualized subdiaphragmatic structures demonstrate no remarkable findings.       CHEST WALL AND OSSEOUS STRUCTURES: Chest wall is within normal limits. No acute osseous pathology.There are no suspicious osseous lesions.Degenerative  changes of the spine.       1.  Large multiloculated left pleural effusion with adjacent atelectasis. The sterility of this fluid collection is unable to be assessed. No evidence of pleural thickening/enhancement. Recommend follow-up chest CT 4-6 weeks after appropriate treatment to evaluate for resolution. 2. Trace right pleural effusion with right middle lobe and lower lobe opacities favored to represent atelectasis.   I personally reviewed the images/study and resident's interpretation and I agree with the findings as stated by Hazel Cohen MD (resident radiologist). This study was analyzed and interpreted at [place], [place].   MACRO: Critical Finding:  See findings. Notification was initiated on 6/13/2024 at 4:11 pm by  Yosef Warner.  (**-YCF-**) Instructions:   Signed by: Yosef Warner 6/13/2024 4:11 PM Dictation workstation:   [id number]    XR chest 1 view    Result Date: 6/13/2024  Interpreted By:  Lillian Tobar and Hofer Lindsay STUDY: XR CHEST 1 VIEW;  6/13/2024 9:01 am   INDICATION: Signs/Symptoms:increased work of breathing, hypoxia.   COMPARISON: None.   ACCESSION NUMBER(S): [id number]   ORDERING CLINICIAN: HUNTER AGRAWAL   FINDINGS: AP radiograph of the chest was provided.     CARDIOMEDIASTINAL SILHOUETTE: The cardiomediastinal silhouette is obscured by an overlying left lung opacity.   LUNGS: Dense consolidation of the left mid to lower lung blunting of the left costophrenic angle. Slight blunting of the right costophrenic angle. No pneumothorax.   ABDOMEN: No remarkable upper abdominal findings.   BONES: No acute osseous changes.       1.  Dense consolidation of the left mid to lower lung concerning for large pleural effusion and/or infectious infiltrate. 2. Small right pleural effusion.   I personally reviewed the images/study and resident's interpretation and I agree with the findings as stated by Hazel Cohen MD (resident radiologist).  This study was analyzed and interpreted at [facility], [place], [place].   MACRO: None   Signed by: Lillian Tobar 6/13/2024 9:49 AM Dictation workstation:   [id number]     XR chest 1 view    Result Date: 6/16/2024  Interpreted By:  Giovani García, STUDY: XR CHEST 1 VIEW; 6/16/2024 8:30 am   INDICATION: Signs/Symptoms:Daily to assess pleural effusion/ empyema and pigtail.   COMPARISON: 06/15/2024.   ACCESSION NUMBER(S): [id number]   ORDERING CLINICIAN: SHALINI MC   FINDINGS: Left-sided pigtail catheter in place.   CARDIOMEDIASTINAL SILHOUETTE: Cardiomegaly versus pericardial effusion..   LUNGS: Slight interval improvement in left lung aeration with residual atelectasis/effusion. No pneumothorax.   ABDOMEN: No remarkable upper abdominal findings.   BONES: No acute osseous changes.       1.  Slight interval improvement in left-sided pleural effusion/loculation. Residual pleural effusion and left basilar chest tube.     Signed by: Giovani García 6/16/2024 1:30 PM Dictation workstation:   [id number]    XR chest 1 view    Result Date: 6/15/2024  Interpreted By:  Giovani García, STUDY: XR CHEST 1 VIEW; 6/15/2024 7:55 am   INDICATION: Signs/Symptoms:AM upright.   COMPARISON: 06/13/2024.   ACCESSION NUMBER(S): [id number]   ORDERING CLINICIAN: ABBEY JORGE   FINDINGS: Left-sided pigtail in place.   CARDIOMEDIASTINAL SILHOUETTE: Cardiomegaly versus pericardial effusion.   LUNGS: Minimal improvement in loculated left-sided pleural effusion. Right basilar atelectasis with low lung volumes.   ABDOMEN: No remarkable upper abdominal findings.   BONES: No acute osseous changes.       1.  Minimal improvement in loculated left-sided pleural effusion status post pigtail catheter. Pneumothorax.     Signed by: Giovani García 6/15/2024 8:43 AM Dictation workstation:   [id number]    US thoracentesis w insertion of tube includes water seal when performed    Result Date:  6/15/2024  Interpreted By:  Bharati Doan,  and Irlanda Pineda STUDY: US THORACENTESIS WITH INSERTION OF TUBE INCLUDES WATER SEAL WHEN PERFORMED 6/14/2024 10:34 am   INDICATION: Signs/Symptoms:Large legft pleural effusion with hypoxic respiratory failure   COMPARISON: CT chest on 06/13/2024   ACCESSION NUMBER(S): [id number]   ORDERING CLINICIAN: LEN RAMIREZ   TECHNIQUE: INTERVENTIONALIST(S): Bharati Doan MD   CONSENT: The patient was informed of the nature of the proposed procedure. The purposes, alternatives, risks, and benefits were explained and discussed. All questions were answered and consent was obtained.   SEDATION: Moderate conscious IV sedation services (supervision of administration, induction, and maintenance) were provided by the physician performing the procedure with intravenous fentanyl 50mcg and versed 1mg for 15 minutes the physician was assisted by an independent trained observer, an interventional radiology nurse, in the continuous monitoring of patient level of consciousness and physiologic status.   MEDICATION/CONTRAST: No additional   TIME OUT: A time out was performed immediately prior to procedure start with the interventional team, correctly identifying the patient name, date of birth, MRN, procedure, anatomy (including marking of site and side), patient position, procedure consent form, relevant laboratory and imaging test results, antibiotic administration, safety precautions, and procedure-specific equipment needs.   COMPLICATIONS: No immediate adverse events identified.   FINDINGS: The patient was placed in the upright supine position.   The left pleural space was examined with grey scale ultrasound, and the most accessible fluid identified and marked for left-sided chest tube placement   The skin was prepped and draped in usual manner. 1% lidocaine was injected subcutaneously and then into the deeper tissues surrounding the target area of for chest tube placement. A 12 Faroese One-Step  pigtail drainage catheter was then slowly advanced into the pleural space, and the stylet was removed. Approximately 40 mL of serosanguineous/purulent colored fluid was removed and sent for fluid studies.   The pigtail drainage catheter was then connected to a pleur-evac, which collected an additional 70 mL of serosanguineous colored fluid.   The patient tolerated the procedure well and there were no immediate complications. Specimen(s) sent to the laboratory for culture.       Uneventful 12 Nigerian left chest tube placement. The chest tube was connected to a Pleur-evac. Please connect the Pleur-evac to continuous wall suction.   I was present for and/or performed the critical portions of the procedure and immediately available throughout the entire procedure.     I personally reviewed the images/study and I agree with radiology resident Dr. Miriam Fournier's findings as stated. This study was interpreted at [place], [place].   MACRO: None.   Signed by: Bharati Doan 6/15/2024 7:45 AM Dictation workstation:   [id number]    CT chest w IV contrast    Result Date: 6/13/2024  Interpreted By:  Yosef Warner and Hofer Lindsay STUDY: CT CHEST W IV CONTRAST;  6/13/2024 2:38 pm   INDICATION: Signs/Symptoms:Hypoxia pneumonia concern for empyema.   COMPARISON: None.   ACCESSION NUMBER(S): [id number]   ORDERING CLINICIAN: HUNTER AGRAWAL   TECHNIQUE: Helical data acquisition of the chest was obtained following intravenous administration of 80 MLOmnipaque 350.  Images were reformatted in axial, coronal, and sagittal planes.   FINDINGS: LUNGS AND AIRWAYS: The trachea and central airways are patent. No endobronchial lesion is seen.   Large multiloculated left pleural effusion with adjacent atelectasis. Trace right pleural effusion. Areas of consolidation within the right middle lobe most likely represent atelectasis. Additional dependent atelectasis in the right lower lobe. Small amount  of atelectasis throughout the right lung. No focal pulmonary nodules within the visualized lung zones.     MEDIASTINUM AND MARY ANNE, LOWER NECK AND AXILLA: The visualized thyroid gland is within normal limits. No evidence of thoracic lymphadenopathy by CT criteria. Few small subcentimeter sized mediastinal lymph nodes are felt to be reactive in nature. Esophagus appears within normal limits as seen.   HEART AND VESSELS: The thoracic aorta normal in course and caliber.There are mild scattered atherosclerosis present, including calcified and noncalcified plaques. Main pulmonary artery and its branches are normal in caliber. No coronary artery calcifications are seen. Please note, the study is not optimized for evaluation of coronary arteries. The cardiac chambers are not enlarged. There is no pericardial effusion seen.   UPPER ABDOMEN: The visualized subdiaphragmatic structures demonstrate no remarkable findings.       CHEST WALL AND OSSEOUS STRUCTURES: Chest wall is within normal limits. No acute osseous pathology.There are no suspicious osseous lesions.Degenerative changes of the spine.       1.  Large multiloculated left pleural effusion with adjacent atelectasis. The sterility of this fluid collection is unable to be assessed. No evidence of pleural thickening/enhancement. Recommend follow-up chest CT 4-6 weeks after appropriate treatment to evaluate for resolution. 2. Trace right pleural effusion with right middle lobe and lower lobe opacities favored to represent atelectasis.   I personally reviewed the images/study and resident's interpretation and I agree with the findings as stated by Hazel Cohen MD (resident radiologist). This study was analyzed and interpreted at [facility], [place], [place].   MACRO: Critical Finding:  See findings. Notification was initiated on 6/13/2024 at 4:11 pm by  Yosef Warner.  (**-YCF-**) Instructions:   Signed by: Yosef Warner 6/13/2024 4:11 PM  Dictation workstation:   [id number]    XR chest 1 view    Result Date: 6/13/2024  Interpreted By:  Lillian Tobar and Hofer Lindsay STUDY: XR CHEST 1 VIEW;  6/13/2024 9:01 am   INDICATION: Signs/Symptoms:increased work of breathing, hypoxia.   COMPARISON: None.   ACCESSION NUMBER(S): [id number]   ORDERING CLINICIAN: HUNTER AGRAWAL   FINDINGS: AP radiograph of the chest was provided.     CARDIOMEDIASTINAL SILHOUETTE: The cardiomediastinal silhouette is obscured by an overlying left lung opacity.   LUNGS: Dense consolidation of the left mid to lower lung blunting of the left costophrenic angle. Slight blunting of the right costophrenic angle. No pneumothorax.   ABDOMEN: No remarkable upper abdominal findings.   BONES: No acute osseous changes.       1.  Dense consolidation of the left mid to lower lung concerning for large pleural effusion and/or infectious infiltrate. 2. Small right pleural effusion.   I personally reviewed the images/study and resident's interpretation and I agree with the findings as stated by Hazel Cohen MD (resident radiologist). This study was analyzed and interpreted at [place], [place].   MACRO: None   Signed by: Lillian Tobar 6/13/2024 9:49 AM Dictation workstation:   [id number]         Assessment/Plan   Principal Problem:    Empyema (Multi)  Active Problems:    Pneumonia of left lower lobe due to infectious organism    Marylu Amor is a 78 y.o. female with hx of HTN and remote smoking hx (50 years ago, 2 py) has had cough for past 3 weeks with chest pain and diaphoresis. Seen at [facility] with leukocytosis 19.0 and CT showing rounded masslike component in LLL with cf neoplasm, admitted for treatment of loculated lower lobe effusion. Repeat CT confirming large multiloculated left pleural effusion without thickening or enhancement, no mass noted.      lytic therapy through IR pigtail BID attempted yesterday but pigtail was  clogged. This morning pigtail was almost out of the chest     - repeat IR pigtail placement in basilar loculation   - daily CXR   - keep current pigtail in place while waiting for new IR pigtail   -keep IR pigtail to -20 sxn   -consider repeat CT Monday         Discussed with Dr. Nicola Ko DO

## 2024-06-16 NOTE — SIGNIFICANT EVENT
Attempted lytic therapy this afternoon however pigtail appears to be clogged. Lytic therapy was not able to be administered.   Will review xray in the morning and determine next steps     Discussed with Dr. Nicola Ko, DO

## 2024-06-17 ENCOUNTER — APPOINTMENT (OUTPATIENT)
Dept: RADIOLOGY | Facility: HOSPITAL | Age: 79
End: 2024-06-17
Payer: MEDICARE

## 2024-06-17 LAB
GLUCOSE BLD MANUAL STRIP-MCNC: 61 MG/DL (ref 74–99)
GLUCOSE BLD MANUAL STRIP-MCNC: 76 MG/DL (ref 74–99)
GLUCOSE BLD MANUAL STRIP-MCNC: 93 MG/DL (ref 74–99)
LDH SERPL L TO P-CCNC: 165 U/L (ref 84–246)
PROT SERPL-MCNC: 6.4 G/DL (ref 6.4–8.2)

## 2024-06-17 PROCEDURE — 71250 CT THORAX DX C-: CPT | Performed by: RADIOLOGY

## 2024-06-17 PROCEDURE — 36415 COLL VENOUS BLD VENIPUNCTURE: CPT | Performed by: INTERNAL MEDICINE

## 2024-06-17 PROCEDURE — 71045 X-RAY EXAM CHEST 1 VIEW: CPT

## 2024-06-17 PROCEDURE — 71045 X-RAY EXAM CHEST 1 VIEW: CPT | Performed by: RADIOLOGY

## 2024-06-17 PROCEDURE — 2500000001 HC RX 250 WO HCPCS SELF ADMINISTERED DRUGS (ALT 637 FOR MEDICARE OP): Performed by: NURSE PRACTITIONER

## 2024-06-17 PROCEDURE — 2500000005 HC RX 250 GENERAL PHARMACY W/O HCPCS: Performed by: STUDENT IN AN ORGANIZED HEALTH CARE EDUCATION/TRAINING PROGRAM

## 2024-06-17 PROCEDURE — 1210000001 HC SEMI-PRIVATE ROOM DAILY

## 2024-06-17 PROCEDURE — 76604 US EXAM CHEST: CPT

## 2024-06-17 PROCEDURE — 84155 ASSAY OF PROTEIN SERUM: CPT | Performed by: INTERNAL MEDICINE

## 2024-06-17 PROCEDURE — 2500000004 HC RX 250 GENERAL PHARMACY W/ HCPCS (ALT 636 FOR OP/ED): Performed by: NURSE PRACTITIONER

## 2024-06-17 PROCEDURE — 82947 ASSAY GLUCOSE BLOOD QUANT: CPT | Mod: 91

## 2024-06-17 PROCEDURE — 71250 CT THORAX DX C-: CPT

## 2024-06-17 PROCEDURE — 99233 SBSQ HOSP IP/OBS HIGH 50: CPT | Performed by: INTERNAL MEDICINE

## 2024-06-17 PROCEDURE — 83615 LACTATE (LD) (LDH) ENZYME: CPT | Performed by: INTERNAL MEDICINE

## 2024-06-17 PROCEDURE — 76604 US EXAM CHEST: CPT | Performed by: RADIOLOGY

## 2024-06-17 RX ORDER — IPRATROPIUM BROMIDE AND ALBUTEROL SULFATE 2.5; .5 MG/3ML; MG/3ML
3 SOLUTION RESPIRATORY (INHALATION) EVERY 6 HOURS PRN
Status: DISCONTINUED | OUTPATIENT
Start: 2024-06-17 | End: 2024-06-20 | Stop reason: HOSPADM

## 2024-06-17 ASSESSMENT — COGNITIVE AND FUNCTIONAL STATUS - GENERAL
DAILY ACTIVITIY SCORE: 22
TOILETING: A LITTLE
MOVING TO AND FROM BED TO CHAIR: A LITTLE
MOVING TO AND FROM BED TO CHAIR: A LITTLE
MOBILITY SCORE: 21
CLIMB 3 TO 5 STEPS WITH RAILING: A LITTLE
DRESSING REGULAR UPPER BODY CLOTHING: A LITTLE
MOBILITY SCORE: 21
TOILETING: A LITTLE
CLIMB 3 TO 5 STEPS WITH RAILING: A LITTLE
DRESSING REGULAR UPPER BODY CLOTHING: A LITTLE
WALKING IN HOSPITAL ROOM: A LITTLE
DAILY ACTIVITIY SCORE: 22
WALKING IN HOSPITAL ROOM: A LITTLE

## 2024-06-17 ASSESSMENT — PAIN SCALES - GENERAL
PAINLEVEL_OUTOF10: 0 - NO PAIN

## 2024-06-17 ASSESSMENT — PAIN - FUNCTIONAL ASSESSMENT
PAIN_FUNCTIONAL_ASSESSMENT: 0-10
PAIN_FUNCTIONAL_ASSESSMENT: 0-10

## 2024-06-17 NOTE — PROGRESS NOTES
"Marylu Amor is a 78 y.o. female on day 3 of admission presenting with Empyema (Multi).    Subjective   Patient was seen and examined at bedside. Patient is on oxygen and stable. She reported her breathing has improved from prior admissions.     Objective     Physical Exam  General: Lying in bed without distress.  Cooperative.  Skin: No rashes or ulcerations.  HEENT: Sclera is white.  Mucous membranes dry.  Cardiac: Regular rate and rhythm, S1/S2 normal.  Lungs: clear to auscultation, no wheezing, no rhonchi appreciated   Abdomen: Soft, nontender, moderately obese abdomen, BS +  Extremities: No cyanosis.  No lower extremity edema.  Neurologic: Alert and oriented x3.  No focal deficits.  Psychiatric: Appropriate mood and behavior.  Currently no agitation.  Last Recorded Vitals  Blood pressure 164/79, pulse 94, temperature 36.6 °C (97.9 °F), temperature source Temporal, resp. rate 16, height 1.575 m (5' 2.01\"), weight 98.5 kg (217 lb 3.2 oz), SpO2 96%.  Intake/Output last 3 Shifts:  I/O last 3 completed shifts:  In: 240 (2.4 mL/kg) [P.O.:240]  Out: 70 (0.7 mL/kg) [Drains:70]  Weight: 98.5 kg     Relevant Results  Scheduled medications  acetaminophen, 650 mg, oral, 4x daily  alteplase (Activase) 10 mg in sodium chloride 0.9% 50 mL syringe, 10 mg, intrapleural, Once  azithromycin, 500 mg, oral, q24h WILBERTO  dornase Alpha (Pulmozyme) 5 mg in sodium chloride 0.9% 50 mL syringe, 5 mg, intrapleural, Once  [Held by provider] enoxaparin, 40 mg, subcutaneous, q24h  ipratropium-albuteroL, 3 mL, nebulization, TID  lidocaine, 1 patch, transdermal, Daily  losartan 100 mg, hydroCHLOROthiazide 25 mg for Hyzaar 100/25, , oral, Daily  oxygen, , inhalation, Continuous - Inhalation  piperacillin-tazobactam, 4.5 g, intravenous, q6h  sennosides-docusate sodium, 2 tablet, oral, BID  sodium chloride 0.9 % 60 mL intrapleural syringe, , intrapleural, Once      Continuous medications     PRN medications  PRN medications: albuterol, " benzocaine-menthol, benzonatate, HYDROmorphone, hydrOXYzine HCL, ondansetron **OR** ondansetron, oxyCODONE, oxyCODONE       Assessment/Plan   Ms John is a 78-year-old female with PMHx: HTN who presented to [facility] from [facility].  Patient states she had had a moist productive cough for the past 3 weeks it has been worsening states it been hurting to cough and she been waking up in the middle of the night diaphoretic.  She went to the urgent care in [place] was found to have a loculated pneumonia and was transferred to [facility] where they started her on IV antibiotics and she was then transferred to [facility] for further care.  Patient was started on azithromycin, Zosyn.  Thoracic surgery consulted.  Repeat CT chest done shows large multiloculated pleural effusion.  Thoracic surgery recommended interventional radiology for chest tube placement which patient received on 6/14.            Assessment/Plan   Acute hypoxemic respiratory failure  Community-acquired pneumonia with multiloculated pleural effusion  -Repeat CT chest shows multiloculated pleural effusion with noted atelectasis.  -NPO after midnight to reinsert the chest tube  -Continue IV Zosyn and azithromycin for now  -will follow up on pleural fluid studies supposed to be sent on the day chest tube was placed  -will nee ID consult     HTN  -c/w home Losartan/hydrochlorothiazide.  -Initially uncontrolled but likely due to distress, today blood pressure in the 140s.  Continue to monitor.     Disposition: Patient needs re-insertion of the chest tube.  Needs ID consult and continue recs by thoracic sugery before discharge      I spent 45 minutes in the professional and overall care of this patient.      Leida Wood DO

## 2024-06-17 NOTE — PROGRESS NOTES
"Marylu Amor is a 78 y.o. female on day 4 of admission presenting with Empyema (Multi).    Subjective   Patient was seen and examined at bedside. IR was unable to find loculated effusion     Objective     Physical Exam  General: Lying in bed without distress.  Cooperative.  Skin: No rashes or ulcerations.  HEENT: Sclera is white.  Mucous membranes dry.  Cardiac: Regular rate and rhythm, S1/S2 normal.  Lungs: clear to auscultation, no wheezing, no rhonchi appreciated   Abdomen: Soft, nontender, moderately obese abdomen, BS +  Extremities: No cyanosis.  No lower extremity edema.  Neurologic: Alert and oriented x3.  No focal deficits.  Psychiatric: Appropriate mood and behavior.  Currently no agitation.  Last Recorded Vitals  Blood pressure 153/84, pulse 74, temperature 36.2 °C (97.2 °F), temperature source Temporal, resp. rate 20, height 1.575 m (5' 2.01\"), weight 98.5 kg (217 lb 3.2 oz), SpO2 99%.  Intake/Output last 3 Shifts:  I/O last 3 completed shifts:  In: 220 (2.2 mL/kg) [P.O.:220]  Out: 0 (0 mL/kg)   Weight: 98.5 kg     Relevant Results  Scheduled medications  acetaminophen, 650 mg, oral, 4x daily  alteplase (Activase) 10 mg in sodium chloride 0.9% 50 mL syringe, 10 mg, intrapleural, Once  azithromycin, 500 mg, oral, q24h WILBERTO  dextrose 5 % and sodium chloride 0.45 % bolus, 500 mL, intravenous, Once  dornase Alpha (Pulmozyme) 5 mg in sodium chloride 0.9% 50 mL syringe, 5 mg, intrapleural, Once  [Held by provider] enoxaparin, 40 mg, subcutaneous, q24h  lidocaine, 1 patch, transdermal, Daily  losartan 100 mg, hydroCHLOROthiazide 25 mg for Hyzaar 100/25, , oral, Daily  oxygen, , inhalation, Continuous - Inhalation  piperacillin-tazobactam, 4.5 g, intravenous, q6h  sennosides-docusate sodium, 2 tablet, oral, BID  sodium chloride 0.9 % 60 mL intrapleural syringe, , intrapleural, Once      Continuous medications     PRN medications  PRN medications: albuterol, benzocaine-menthol, benzonatate, HYDROmorphone, " hydrOXYzine HCL, ipratropium-albuteroL, ondansetron **OR** ondansetron, oxyCODONE, oxyCODONE     Assessment/Plan   Ms John is a 78-year-old female with PMHx: HTN who presented to [facility] from [facility].  Patient states she had had a moist productive cough for the past 3 weeks it has been worsening states it been hurting to cough and she been waking up in the middle of the night diaphoretic.  She went to the urgent care in [place] was found to have a loculated pneumonia and was transferred to [facility] where they started her on IV antibiotics and she was then transferred to [facility] for further care.  Patient was started on azithromycin, Zosyn.  Thoracic surgery consulted.  Repeat CT chest done shows large multiloculated pleural effusion.  Thoracic surgery recommended interventional radiology for chest tube placement which patient received on 6/14.            Assessment/Plan   Acute hypoxemic respiratory failure/ multiloculated pleural effusion       1. CT chest to re-evaluate the effusion ordered for tomorrow       2. Pleural fluid growing strep       3. Continue IV zosyn        4. Based up on repeat eval tomorrow, will get ID involved        5. Needs to wean her off oxygen      2. HTN     1. Continue home Losartan/hydrochlorothiazide        Disposition: Pending CT repeat to re-evaluate the loculated pleural effusions      I spent 45 minutes in the professional and overall care of this patient.           Leida Wood DO

## 2024-06-17 NOTE — POST-PROCEDURE NOTE
Interventional Radiology Brief Postprocedure Note    Attending: Bharati Doan MD    Assistant: Lenard Johnsno DO    Diagnosis: Hx of left empyema with dislodged pigtail catheter.     Description of procedure: On initial ultrasound images were taken in the US IR room,  the loculated effusion appears to have significantly decreased in size when compared to prior imaging, status post chest tube placement. There was no substantial fluid collection to place a chest tube. Therefore, no intervention was done.     Anesthesia:  none    Complications: None    Estimated Blood Loss: none    Medications  As of 06/17/24 1456      enoxaparin (Lovenox) syringe 40 mg (mg) Total dose:  Cannot be calculated* Dosing weight:  92.5   *Administration dose not documented     Date/Time Rate/Dose/Volume Action       06/13/24  0730 *40 mg Missed      0748 *Not included in total Held by provider     06/14/24  0730 *40 mg Missed     06/15/24  0730 *40 mg Missed     06/16/24  0730 *40 mg Missed     06/17/24  0730 *Not included in total Automatically Held               sennosides-docusate sodium (Rebecca-Colace) 8.6-50 mg per tablet 2 tablet (tablet) Total dose:  2 tablet* Dosing weight:  92.5   *Administration not included in total     Date/Time Rate/Dose/Volume Action       06/13/24  0911 2 tablet Given      2100 *2 tablet Missed     06/14/24  0900 *2 tablet Missed      2100 *2 tablet Missed     06/15/24  0912 *2 tablet Missed      2100 *2 tablet Missed     06/16/24  0842 *2 tablet Missed      2100 *2 tablet Missed     06/17/24  0913 *2 tablet Missed               oxygen (O2) therapy (L/min) Total volume:  Not documented* Dosing weight:  92.5   *Total volume has not been documented. View each administration to see the amount administered.     Date/Time Rate/Dose/Volume Action       06/13/24  1017 4 L/min Start      2000 5 L/min Start     06/14/24  0800 5 L/min Start      2000 24 percent Rate Verify Medical Gas     06/15/24  0800 4 L/min Start       2000 4 L/min Start     06/16/24  0800 5 L/min Start      2000 5 L/min Start               LORazepam (Ativan) tablet 1 mg (mg) Total dose:  0 mg* Dosing weight:  92.5   *Administration not included in total     Date/Time Rate/Dose/Volume Action       06/13/24  0730 *1 mg Missed               hydrOXYzine HCL (Atarax) tablet 25 mg (mg) Total dose:  50 mg Dosing weight:  92.5      Date/Time Rate/Dose/Volume Action       06/13/24  0911 25 mg Given     06/14/24  2048 25 mg Given               piperacillin-tazobactam-dextrose (Zosyn) IV 4.5 g (mL/hr) Total dose:  9 g* Dosing weight:  92.5   *From user-documented volume     Date/Time Rate/Dose/Volume Action       06/13/24  1045 4.5 g - 200 mL/hr (over 30 min) New Bag      1115 100 mL Stopped      1627 4.5 g - 200 mL/hr (over 30 min) New Bag      1657 100 mL Stopped      2119 4.5 g - 200 mL/hr (over 30 min) New Bag      2149  (over 30 min) Stopped     06/14/24  0426 4.5 g - 200 mL/hr (over 30 min) New Bag      0456  (over 30 min) Stopped      1410 4.5 g - 200 mL/hr (over 30 min) New Bag      1440  (over 30 min) Stopped      1803 4.5 g - 200 mL/hr (over 30 min) New Bag      1833  (over 30 min) Stopped      2329 4.5 g - 200 mL/hr (over 30 min) New Bag      2359  (over 30 min) Stopped     06/15/24  0438 4.5 g - 200 mL/hr (over 30 min) New Bag      0508  (over 30 min) Stopped      1100 4.5 g - 200 mL/hr (over 30 min) New Bag      1130  (over 30 min) Stopped      1718 4.5 g - 200 mL/hr (over 30 min) New Bag      1748  (over 30 min) Stopped      2200 4.5 g - 200 mL/hr (over 30 min) New Bag      2230  (over 30 min) Stopped     06/16/24  0628 4.5 g - 200 mL/hr (over 30 min) New Bag      0658  (over 30 min) Stopped      1107 4.5 g - 200 mL/hr (over 30 min) New Bag      1137  (over 30 min) Stopped      1705 4.5 g - 200 mL/hr (over 30 min) New Bag      1735  (over 30 min) Stopped      2231 4.5 g - 200 mL/hr (over 30 min) New Bag      2301  (over 30 min) Stopped     06/17/24  0601 4.5  g - 200 mL/hr (over 30 min) New Bag      0631  (over 30 min) Stopped      1132 4.5 g - 200 mL/hr (over 30 min) New Bag      1202  (over 30 min) Stopped               azithromycin (Zithromax) tablet 500 mg (mg) Total dose:  2,500 mg Dosing weight:  92.5      Date/Time Rate/Dose/Volume Action       06/13/24  1048 500 mg Given     06/14/24  1409 500 mg Given     06/15/24  0911 500 mg Given     06/16/24  0842 500 mg Given     06/17/24  0911 500 mg Given               losartan 100 mg, hydroCHLOROthiazide 25 mg for Hyzaar 100/25 Total volume:  Not documented* Dosing weight:  92.5   *Total volume has not been documented. View each administration to see the amount administered.     Date/Time Rate/Dose/Volume Action       06/13/24  1045  Given     06/14/24  1410  Given     06/15/24  0911  Given     06/16/24  0843  Given     06/17/24  0911  Given               acetaminophen (Tylenol) tablet 975 mg (mg) Total dose:  3,900 mg* Dosing weight:  92.5   *Administration not included in total     Date/Time Rate/Dose/Volume Action       06/13/24  0911 975 mg Given      1627 975 mg Given     06/14/24  0007 975 mg Given      1410 975 mg Given      1630 *975 mg Missed               acetaminophen (Tylenol) tablet 650 mg (mg) Total dose:  3,900 mg* Dosing weight:  98.5   *Administration not included in total     Date/Time Rate/Dose/Volume Action       06/14/24  2048 650 mg Given     06/15/24  0611 650 mg Given      1300 *650 mg Missed      1717 *650 mg Missed      2200 650 mg Given     06/16/24  0628 650 mg Given      1300 *650 mg Missed      1700 *650 mg Missed      2110 650 mg Given     06/17/24  0601 650 mg Given               ipratropium-albuteroL (Duo-Neb) 0.5-2.5 mg/3 mL nebulizer solution 3 mL (mL) Total volume:  15 mL Dosing weight:  98.5      Date/Time Rate/Dose/Volume Action       06/13/24  1017 3 mL Given      1500 3 mL Given      2224 3 mL Given     06/14/24  0900 *3 mL Missed      1500 3 mL Given      2100 *3 mL Missed       2220 3 mL Given     06/15/24  0900 *3 mL Missed      1441 *3 mL Missed      2100 *3 mL Missed     06/16/24  0845 *3 mL Missed      1455 *3 mL Missed      2100 *3 mL Missed               albuterol 2.5 mg /3 mL (0.083 %) nebulizer solution 2.5 mg (mg) Total dose:  2.5 mg Dosing weight:  98.5      Date/Time Rate/Dose/Volume Action       06/13/24  2242 2.5 mg Given               iohexol (OMNIPaque) 350 mg iodine/mL solution 80 mL (mL) Total volume:  80 mL Dosing weight:  98.5      Date/Time Rate/Dose/Volume Action       06/13/24  1439 80 mL Given               lactated Ringer's infusion (mL/hr) Total volume:  935 mL* Dosing weight:  98.5   *From user-documented volume     Date/Time Rate/Dose/Volume Action       06/13/24  1722 100 mL/hr New Bag     06/14/24  0228  Stopped      0229 100 mL/hr New Bag      0244 100 mL/hr - 935 mL Rate Verify     06/15/24  0208  Stopped      0209 100 mL/hr New Bag      1719 100 mL/hr New Bag     06/16/24  1029 100 mL/hr New Bag      1959  Stopped               fentaNYL PF (Sublimaze) injection (mcg) Total dose:  50 mcg      Date/Time Rate/Dose/Volume Action       06/14/24  1006 50 mcg Given               midazolam (Versed) injection (mg) Total dose:  1 mg      Date/Time Rate/Dose/Volume Action       06/14/24  1006 1 mg Given               HYDROmorphone (Dilaudid) injection 0.2 mg (mg) Total dose:  0.2 mg Dosing weight:  98.5      Date/Time Rate/Dose/Volume Action       06/14/24  1810 0.2 mg Given               lidocaine 4 % patch 1 patch (patch) Total dose:  1 patch* Dosing weight:  98.5   *Administration not included in total     Date/Time Rate/Dose/Volume Action       06/14/24  1803 1 patch (over 720 min) Medication Applied     06/15/24  0603  (over 720 min) Medication Removed      1718 *1 patch (over 720 min) Missed     06/16/24  1745 *1 patch (over 720 min) Missed               ketorolac (Toradol) injection 15 mg (mg) Total dose:  15 mg Dosing weight:  98.5      Date/Time  Rate/Dose/Volume Action       06/14/24  2123 15 mg Given                   No specimens collected      See detailed result report with images in PACS.    The patient tolerated the procedure well without incident or complication and is in stable condition.

## 2024-06-17 NOTE — PRE-PROCEDURE NOTE
Interventional Radiology Preprocedure Note    Indication for procedure: The encounter diagnosis was Empyema (Multi).    Relevant review of systems: NA    Relevant Labs:   Lab Results   Component Value Date    CREATININE 0.73 06/14/2024    EGFR 85 06/14/2024    INR 1.2 (H) 06/13/2024    PROTIME 13.4 (H) 06/13/2024       Planned Sedation/Anesthesia: Moderate    Airway assessment: normal    Directed physical examination:    Physical Exam  Constitutional:       General: She is not in acute distress.  Pulmonary:      Effort: No respiratory distress.   Neurological:      Mental Status: She is oriented to person, place, and time.   Psychiatric:         Mood and Affect: Mood normal.           Mallampati: III (soft and hard palate and base of uvula visible)    ASA Score: ASA 3 - Patient with moderate systemic disease with functional limitations    Benefits, risks and alternatives of procedure and planned sedation have been discussed with the patient and/or their representative. All questions answered and they agree to proceed.

## 2024-06-17 NOTE — PROGRESS NOTES
6/17/24 2246 Transitional Care Coordinator Notes:    Patient will have a new chest tube placed today by Thoracic Surgery. IR will place a left pigtail today as well. Will continue to follow for discharge updates.    Discharge disposition: home  ADOD: 2-4 days                       Assessment/Plan   Principal Problem:    Empyema (Multi)  Active Problems:    Pneumonia of left lower lobe due to infectious organism               Rocío Solano RN

## 2024-06-17 NOTE — PROGRESS NOTES
"Marylu Amor is a 78 y.o. female on day 4 of admission presenting with Empyema (Multi).    Subjective   Pt feeling okay today. No complaints or overnight events. Awaiting new IR pigtail as current drain is in the subcutaneous tissue on CXR. Pigtail also flushed with the holes noted to be out of the chest.     Objective     Physical Exam  Constitutional: NAD, sitting upright at the side of the bed  Neuro: A/O x4, no gross deficits   Psych: Normal affect  HEENT: No deformities, no scleral icterus   Pulmonary: Tachypnea, on 5L nasal cannula, left pigtail in subcutaneous tissue. Minimal output and no leak.  Skin: Warm and dry  MSK: Moving all four extremities     Last Recorded Vitals  Blood pressure 149/86, pulse 76, temperature 35.9 °C (96.6 °F), temperature source Temporal, resp. rate 16, height 1.575 m (5' 2.01\"), weight 98.5 kg (217 lb 3.2 oz), SpO2 98%.  Intake/Output last 3 Shifts:  I/O last 3 completed shifts:  In: 460 (4.7 mL/kg) [P.O.:460]  Out: 70 (0.7 mL/kg) [Drains:70]  Weight: 98.5 kg     Relevant Results  Scheduled medications  acetaminophen, 650 mg, oral, 4x daily  alteplase (Activase) 10 mg in sodium chloride 0.9% 50 mL syringe, 10 mg, intrapleural, Once  azithromycin, 500 mg, oral, q24h WILBERTO  dornase Alpha (Pulmozyme) 5 mg in sodium chloride 0.9% 50 mL syringe, 5 mg, intrapleural, Once  [Held by provider] enoxaparin, 40 mg, subcutaneous, q24h  lidocaine, 1 patch, transdermal, Daily  losartan 100 mg, hydroCHLOROthiazide 25 mg for Hyzaar 100/25, , oral, Daily  oxygen, , inhalation, Continuous - Inhalation  piperacillin-tazobactam, 4.5 g, intravenous, q6h  sennosides-docusate sodium, 2 tablet, oral, BID  sodium chloride 0.9 % 60 mL intrapleural syringe, , intrapleural, Once      Continuous medications     PRN medications  PRN medications: albuterol, benzocaine-menthol, benzonatate, HYDROmorphone, hydrOXYzine HCL, ipratropium-albuteroL, ondansetron **OR** ondansetron, oxyCODONE, oxyCODONE     Results for " orders placed or performed during the hospital encounter of 06/13/24 (from the past 24 hour(s))   Lactate dehydrogenase   Result Value Ref Range     84 - 246 U/L   Protein, total   Result Value Ref Range    Total Protein 6.4 6.4 - 8.2 g/dL   POCT GLUCOSE   Result Value Ref Range    POCT Glucose 93 74 - 99 mg/dL      XR chest 1 view    Result Date: 6/16/2024  Interpreted By:  Giovani García, STUDY: XR CHEST 1 VIEW; 6/16/2024 8:30 am   INDICATION: Signs/Symptoms:Daily to assess pleural effusion/ empyema and pigtail.   COMPARISON: 06/15/2024.   ACCESSION NUMBER(S): [id number]   ORDERING CLINICIAN: SHALINI MC   FINDINGS: Left-sided pigtail catheter in place.   CARDIOMEDIASTINAL SILHOUETTE: Cardiomegaly versus pericardial effusion..   LUNGS: Slight interval improvement in left lung aeration with residual atelectasis/effusion. No pneumothorax.   ABDOMEN: No remarkable upper abdominal findings.   BONES: No acute osseous changes.       1.  Slight interval improvement in left-sided pleural effusion/loculation. Residual pleural effusion and left basilar chest tube.     Signed by: Giovani García 6/16/2024 1:30 PM Dictation workstation:   [id number]      Assessment/Plan   Principal Problem:    Empyema (Multi)  Active Problems:    Pneumonia of left lower lobe due to infectious organism    Marylu Amor is a 78 y.o. female with a past medical history of HTN and remote smoking hx (50 years ago, 2 py) who has had a cough for the past 3 weeks with chest pain and diaphoresis. She was seen at [facility] with leukocytosis to 19.0, and a CT showing a rounded masslike component in the LLL with concern of neoplasm. She was admitted for treatment of the loculated lower lobe effusion. Repeat CT confirming large multiloculated left pleural effusion without thickening or enhancement, no mass noted.  Thoracic surgery consulted for recommendations on management.    Left pigtail placed by IR on 6/14 and one dose of  lytic therapy was administered by thoracic surgery. Lytic therapy through left IR pigtail BID attempted 6/15 but pigtail was clogged. On 6/16, the pigtail was almost out of the chest.    Plan:   -Current left pigtail out of the chest in the subcutaneous tissues this morning, left pigtail subsequently removed without complications, and an occlusive dressing was applied. A post pull CXR is pending.   -Await new IR pigtail placement in left basilar loculation later today  -Daily CXR while pigtail is in place  -Will consider dose of lytic therapy later today vs. tomorrow pending the timing of IR replacement   -Wean supplemental oxygen as able  -Antibiotics per primary team  -Rest of care per the primary team    Pt seen and evaluated. Pt discussed with Dr. Petersen.     Maritza Su PA-C

## 2024-06-18 ENCOUNTER — APPOINTMENT (OUTPATIENT)
Dept: RADIOLOGY | Facility: HOSPITAL | Age: 79
End: 2024-06-18
Payer: MEDICARE

## 2024-06-18 LAB
ALBUMIN SERPL BCP-MCNC: 3 G/DL (ref 3.4–5)
ANION GAP SERPL CALC-SCNC: 15 MMOL/L (ref 10–20)
BACTERIA BLD CULT: NORMAL
BACTERIA FLD CULT: ABNORMAL
BUN SERPL-MCNC: 11 MG/DL (ref 6–23)
CALCIUM SERPL-MCNC: 8.6 MG/DL (ref 8.6–10.6)
CHLORIDE SERPL-SCNC: 98 MMOL/L (ref 98–107)
CO2 SERPL-SCNC: 33 MMOL/L (ref 21–32)
CREAT SERPL-MCNC: 0.69 MG/DL (ref 0.5–1.05)
EGFRCR SERPLBLD CKD-EPI 2021: 90 ML/MIN/1.73M*2
ERYTHROCYTE [DISTWIDTH] IN BLOOD BY AUTOMATED COUNT: 13.9 % (ref 11.5–14.5)
GLUCOSE SERPL-MCNC: 104 MG/DL (ref 74–99)
GRAM STN SPEC: ABNORMAL
GRAM STN SPEC: ABNORMAL
HCT VFR BLD AUTO: 34.6 % (ref 36–46)
HGB BLD-MCNC: 10.5 G/DL (ref 12–16)
MAGNESIUM SERPL-MCNC: 2.3 MG/DL (ref 1.6–2.4)
MCH RBC QN AUTO: 28.1 PG (ref 26–34)
MCHC RBC AUTO-ENTMCNC: 30.3 G/DL (ref 32–36)
MCV RBC AUTO: 93 FL (ref 80–100)
NRBC BLD-RTO: 0 /100 WBCS (ref 0–0)
PHOSPHATE SERPL-MCNC: 3.9 MG/DL (ref 2.5–4.9)
PLATELET # BLD AUTO: 452 X10*3/UL (ref 150–450)
POTASSIUM SERPL-SCNC: 3.8 MMOL/L (ref 3.5–5.3)
RBC # BLD AUTO: 3.74 X10*6/UL (ref 4–5.2)
SODIUM SERPL-SCNC: 142 MMOL/L (ref 136–145)
WBC # BLD AUTO: 9.4 X10*3/UL (ref 4.4–11.3)

## 2024-06-18 PROCEDURE — 99233 SBSQ HOSP IP/OBS HIGH 50: CPT | Performed by: INTERNAL MEDICINE

## 2024-06-18 PROCEDURE — 2500000001 HC RX 250 WO HCPCS SELF ADMINISTERED DRUGS (ALT 637 FOR MEDICARE OP): Performed by: NURSE PRACTITIONER

## 2024-06-18 PROCEDURE — 85027 COMPLETE CBC AUTOMATED: CPT | Performed by: INTERNAL MEDICINE

## 2024-06-18 PROCEDURE — 71045 X-RAY EXAM CHEST 1 VIEW: CPT | Performed by: RADIOLOGY

## 2024-06-18 PROCEDURE — 80069 RENAL FUNCTION PANEL: CPT | Performed by: INTERNAL MEDICINE

## 2024-06-18 PROCEDURE — 36415 COLL VENOUS BLD VENIPUNCTURE: CPT | Performed by: INTERNAL MEDICINE

## 2024-06-18 PROCEDURE — 83735 ASSAY OF MAGNESIUM: CPT | Performed by: INTERNAL MEDICINE

## 2024-06-18 PROCEDURE — 1210000001 HC SEMI-PRIVATE ROOM DAILY

## 2024-06-18 PROCEDURE — 71045 X-RAY EXAM CHEST 1 VIEW: CPT

## 2024-06-18 PROCEDURE — 2500000004 HC RX 250 GENERAL PHARMACY W/ HCPCS (ALT 636 FOR OP/ED): Performed by: NURSE PRACTITIONER

## 2024-06-18 PROCEDURE — 2500000005 HC RX 250 GENERAL PHARMACY W/O HCPCS: Performed by: STUDENT IN AN ORGANIZED HEALTH CARE EDUCATION/TRAINING PROGRAM

## 2024-06-18 RX ORDER — ACETAMINOPHEN 325 MG/1
975 TABLET ORAL EVERY 8 HOURS PRN
Status: DISCONTINUED | OUTPATIENT
Start: 2024-06-18 | End: 2024-06-20 | Stop reason: HOSPADM

## 2024-06-18 ASSESSMENT — COGNITIVE AND FUNCTIONAL STATUS - GENERAL
CLIMB 3 TO 5 STEPS WITH RAILING: A LOT
DRESSING REGULAR LOWER BODY CLOTHING: A LITTLE
DRESSING REGULAR UPPER BODY CLOTHING: A LITTLE
DRESSING REGULAR UPPER BODY CLOTHING: A LITTLE
WALKING IN HOSPITAL ROOM: A LOT
MOVING TO AND FROM BED TO CHAIR: A LITTLE
TOILETING: A LITTLE
MOVING TO AND FROM BED TO CHAIR: A LITTLE
MOBILITY SCORE: 18
DRESSING REGULAR UPPER BODY CLOTHING: A LITTLE
WALKING IN HOSPITAL ROOM: A LOT
TOILETING: A LITTLE
CLIMB 3 TO 5 STEPS WITH RAILING: A LOT
DAILY ACTIVITIY SCORE: 21
DAILY ACTIVITIY SCORE: 21
MOVING TO AND FROM BED TO CHAIR: A LITTLE
MOVING TO AND FROM BED TO CHAIR: A LITTLE
DRESSING REGULAR LOWER BODY CLOTHING: A LITTLE
STANDING UP FROM CHAIR USING ARMS: A LITTLE
MOBILITY SCORE: 18
STANDING UP FROM CHAIR USING ARMS: A LITTLE
TOILETING: A LITTLE
CLIMB 3 TO 5 STEPS WITH RAILING: A LOT
DAILY ACTIVITIY SCORE: 21
MOBILITY SCORE: 18
DRESSING REGULAR LOWER BODY CLOTHING: A LITTLE
TOILETING: A LITTLE
STANDING UP FROM CHAIR USING ARMS: A LITTLE
CLIMB 3 TO 5 STEPS WITH RAILING: A LOT
DRESSING REGULAR UPPER BODY CLOTHING: A LITTLE
STANDING UP FROM CHAIR USING ARMS: A LITTLE
WALKING IN HOSPITAL ROOM: A LOT
DRESSING REGULAR LOWER BODY CLOTHING: A LITTLE
WALKING IN HOSPITAL ROOM: A LOT

## 2024-06-18 ASSESSMENT — PAIN SCALES - GENERAL
PAINLEVEL_OUTOF10: 0 - NO PAIN
PAINLEVEL_OUTOF10: 0 - NO PAIN

## 2024-06-18 ASSESSMENT — PAIN - FUNCTIONAL ASSESSMENT
PAIN_FUNCTIONAL_ASSESSMENT: 0-10
PAIN_FUNCTIONAL_ASSESSMENT: 0-10

## 2024-06-18 NOTE — PROGRESS NOTES
"Marylu Amor is a 78 y.o. female on day 5 of admission presenting with Empyema (Multi).    Subjective   Aborted IR left thoracentesis yesterday as the fluid collection was too small.     Today, the pt reports some shortness of breath but notes her supplemental oxygen needs are decreasing.    Objective     Physical Exam  Constitutional: NAD, sitting upright in the chair at the side of the bed  Neuro: A/O x4, no gross deficits   Psych: Normal mood and affect  HEENT: No deformities, no scleral icterus   Pulmonary: Tachypnea, on 3L nasal cannula  Skin: Warm and dry  MSK: Moving all four extremities     Last Recorded Vitals  Blood pressure 128/84, pulse 73, temperature 36.4 °C (97.5 °F), resp. rate 16, height 1.575 m (5' 2.01\"), weight 98.5 kg (217 lb 3.2 oz), SpO2 95%.    Intake/Output last 3 Shifts:  I/O last 3 completed shifts:  In: 440 (4.5 mL/kg) [P.O.:440]  Out: 0 (0 mL/kg)   Weight: 98.5 kg     Relevant Results  Scheduled medications  alteplase (Activase) 10 mg in sodium chloride 0.9% 50 mL syringe, 10 mg, intrapleural, Once  azithromycin, 500 mg, oral, q24h WILBERTO  dextrose 5 % and sodium chloride 0.45 % bolus, 500 mL, intravenous, Once  dornase Alpha (Pulmozyme) 5 mg in sodium chloride 0.9% 50 mL syringe, 5 mg, intrapleural, Once  [Held by provider] enoxaparin, 40 mg, subcutaneous, q24h  lidocaine, 1 patch, transdermal, Daily  losartan 100 mg, hydroCHLOROthiazide 25 mg for Hyzaar 100/25, , oral, Daily  oxygen, , inhalation, Continuous - Inhalation  piperacillin-tazobactam, 4.5 g, intravenous, q6h  sennosides-docusate sodium, 2 tablet, oral, BID  sodium chloride 0.9 % 60 mL intrapleural syringe, , intrapleural, Once      Continuous medications     PRN medications  PRN medications: acetaminophen, albuterol, benzocaine-menthol, benzonatate, HYDROmorphone, hydrOXYzine HCL, ipratropium-albuteroL, ondansetron **OR** ondansetron, oxyCODONE, oxyCODONE     Results for orders placed or performed during the hospital " encounter of 06/13/24 (from the past 24 hour(s))   POCT GLUCOSE   Result Value Ref Range    POCT Glucose 61 (L) 74 - 99 mg/dL   CBC   Result Value Ref Range    WBC 9.4 4.4 - 11.3 x10*3/uL    nRBC 0.0 0.0 - 0.0 /100 WBCs    RBC 3.74 (L) 4.00 - 5.20 x10*6/uL    Hemoglobin 10.5 (L) 12.0 - 16.0 g/dL    Hematocrit 34.6 (L) 36.0 - 46.0 %    MCV 93 80 - 100 fL    MCH 28.1 26.0 - 34.0 pg    MCHC 30.3 (L) 32.0 - 36.0 g/dL    RDW 13.9 11.5 - 14.5 %    Platelets 452 (H) 150 - 450 x10*3/uL   Renal Function Panel   Result Value Ref Range    Glucose 104 (H) 74 - 99 mg/dL    Sodium 142 136 - 145 mmol/L    Potassium 3.8 3.5 - 5.3 mmol/L    Chloride 98 98 - 107 mmol/L    Bicarbonate 33 (H) 21 - 32 mmol/L    Anion Gap 15 10 - 20 mmol/L    Urea Nitrogen 11 6 - 23 mg/dL    Creatinine 0.69 0.50 - 1.05 mg/dL    eGFR 90 >60 mL/min/1.73m*2    Calcium 8.6 8.6 - 10.6 mg/dL    Phosphorus 3.9 2.5 - 4.9 mg/dL    Albumin 3.0 (L) 3.4 - 5.0 g/dL   Magnesium   Result Value Ref Range    Magnesium 2.30 1.60 - 2.40 mg/dL      US chest    Result Date: 6/17/2024  Interpreted By:  Bharati Doan and Liller Gregory STUDY: US CHEST  6/17/2024 2:51 pm   INDICATION: 79 y/o   F with  Signs/Symptoms:needs pig tail chest tube place back on.   COMPARISON: Chest radiograph 06/17/2024, ultrasound guided thoracentesis 06/14/2024   ACCESSION NUMBER(S): [id number]   ORDERING CLINICIAN: HI MOYER   TECHNIQUE: Limited ultrasound of the left lower lung was performed.  Static images were obtained for remote interpretation.   FINDINGS: The patient was brought back to the ultrasound IR suite for attempted replacement of left lower chest pigtail catheter. Upon initial limited ultrasound imaging of the left lower chest, there has been significant interval decrease in size of the previously noted loculated left basilar pleural effusion when compared to prior ultrasound-guided thoracentesis imaging from 06/14/2024. This is likely result of the previously placed chest  tube.   No significant fluid collection identified. Therefore, no intervention was done.       Significant interval decrease in size of the previously noted loculated left sided effusion when compared to prior ultrasound on 06/14/2024 status post chest tube placement. Therefore, no intervention was done given the small residual left-sided effusion.   I personally reviewed the images/study and I agree with the findings as stated above by resident physician, Dr. Lenard Johnson. The study was interpreted at [facility] in [facility].   MACRO: None   Signed by: Bharati Doan 6/17/2024 9:33 PM Dictation workstation:   [id number]    XR chest 1 view    Result Date: 6/17/2024  Interpreted By:  Lazaro Rich and Hofer Lindsay STUDY: XR CHEST 1 VIEW;  6/17/2024 10:21 am   INDICATION: Signs/Symptoms:left pigtail out of chest, upright please.   COMPARISON: Chest radiograph 06/17/2024 at 4:22 a.m.   ACCESSION NUMBER(S): [id number]   ORDERING CLINICIAN: MIKE BOSWELL   FINDINGS: AP radiograph of the chest was provided.   MEDICAL DEVICES: Interval removal of left basilar chest tube.   CARDIOMEDIASTINAL SILHOUETTE: The cardiomediastinal silhouette is obscured by overlying pulmonary opacity, but appears stable in size and configuration.   LUNGS: Dense consolidation within the left lower lung with blunting of the left costophrenic angle. The right lung is clear. No evidence of pneumothorax.   ABDOMEN: No remarkable upper abdominal findings.   BONES: No acute osseous changes.       1.  Interval removal of left basilar chest tube. No evidence of pneumothorax. 2. Similar appearance of left pleural effusion/loculation with adjacent atelectasis/consolidation.   I personally reviewed the images/study and resident's interpretation and I agree with the findings as stated by Hazel Cohen MD (resident radiologist). This study was analyzed and interpreted at [facility]  [place], [place].   MACRO: None   Signed by: Lazaro Rich 6/17/2024 11:06 AM Dictation workstation:   [id number]    XR chest 1 view    Result Date: 6/17/2024  Interpreted By:  Lazaro Rich and Hofer Lindsay STUDY: XR CHEST 1 VIEW;  6/17/2024 4:32 am   INDICATION: Signs/Symptoms:Daily to assess pleural effusion/ empyema and pigtail.   COMPARISON: Multiple prior chest radiographs most recently 06/16/2024   ACCESSION NUMBER(S): [id number]   ORDERING CLINICIAN: SHALINI MC   FINDINGS: AP radiograph of the chest was provided.   MEDICAL DEVICES: The left basilar chest tube is within the subcutaneous tissues of the left chest wall.   CARDIOMEDIASTINAL SILHOUETTE: The cardiomediastinal silhouette is obscured by overlying lung opacities.   LUNGS: Blunting of the left costophrenic angle with dense consolidation within the left lower lung. No pneumothorax.   ABDOMEN: No remarkable upper abdominal findings.   BONES: No acute osseous changes.       1.  Retraction of the left basilar chest tube with the tip within the subcutaneous tissues of the left chest wall. 2. Similar appearance of left-sided pleural effusion/loculation with adjacent atelectasis/consolidation.   I personally reviewed the images/study and resident's interpretation and I agree with the findings as stated by Hazel Cohen MD (resident radiologist). This study was analyzed and interpreted at [facility], [place], [place].   MACRO: None   Signed by: Lazaro Rich 6/17/2024 11:03 AM Dictation workstation:   [id number]      Assessment/Plan   Principal Problem:    Empyema (Multi)  Active Problems:    Pneumonia of left lower lobe due to infectious organism    Marylu Amor is a 78 y.o. female with a past medical history of HTN and remote smoking hx (50 years ago, 2 py) who has had a cough for the past 3 weeks with chest pain and diaphoresis. She was seen at [facility] with leukocytosis to 19.0, and a CT  showing a rounded masslike component in the LLL with concern of neoplasm. She was admitted for treatment of the loculated lower lobe effusion. Repeat CT confirming large multiloculated left pleural effusion without thickening or enhancement, no mass noted. Thoracic surgery consulted for recommendations on management.    Left pigtail placed by IR on 6/14 and one dose of lytic therapy was administered by thoracic surgery. Lytic therapy was attempted again on 6/15, but the pigtail was clogged. On 6/16, the pigtail was almost out of the chest.    Pigtail removed 6/17 as it was completely in the subcutaneous tissues. A post pull CXR was stable. IR also attempted to replace the drain later that day, but no substantial fluid collection was available for drain placement, so a new CT scan was obtained.    Plan:   -CT chest reviewed with Dr. Petersen, IR resident Dr. Lenard Johnson, and Dr. Bharati Doan today. Posterior fluid collection is still too small for a new IR drain.   -Continue to monitor patient's symptoms clinically  -Repeat CXR tomorrow morning   -Wean supplemental oxygen as able  -Antibiotics per primary team  -Rest of care per the primary team    Pt seen and evaluated. Pt discussed with Dr. Petersen.     Maritza Su PA-C

## 2024-06-18 NOTE — CARE PLAN
Problem: Respiratory  Goal: Minimize anxiety/maximize coping throughout shift  Outcome: Progressing  Goal: Minimal/no exertional discomfort or dyspnea this shift  Outcome: Progressing  Goal: No signs of respiratory distress (eg. Use of accessory muscles. Peds grunting)  Outcome: Progressing  Goal: Tolerate pulmonary toileting this shift  Outcome: Progressing  Goal: Verbalize decreased shortness of breath this shift  Outcome: Met  Goal: Increase self care and/or family involvement in next 24 hours  Outcome: Progressing     Problem: Fall/Injury  Goal: Pace activities to prevent fatigue by end of the shift  Outcome: Met      The clinical goals for the shift include Marylu will tolerate wean o2, spo2 >93%  this shift.

## 2024-06-18 NOTE — CARE PLAN
The patient's goals for the shift include      The clinical goals for the shift include Marylu will tolerate wean o2, spo2 >93%  this shift.    Problem: Fall/Injury  Goal: Use assistive devices by end of the shift  Outcome: Progressing     Problem: Respiratory  Goal: Minimize anxiety/maximize coping throughout shift  Outcome: Progressing  Goal: Minimal/no exertional discomfort or dyspnea this shift  Outcome: Progressing  Goal: No signs of respiratory distress (eg. Use of accessory muscles. Peds grunting)  Outcome: Progressing  Goal: Tolerate pulmonary toileting this shift  Outcome: Progressing  Goal: Increase self care and/or family involvement in next 24 hours  Outcome: Progressing     Problem: Pain - Adult  Goal: Verbalizes/displays adequate comfort level or baseline comfort level  Outcome: Progressing     Problem: Discharge Planning  Goal: Discharge to home or other facility with appropriate resources  Outcome: Progressing     Problem: Chronic Conditions and Co-morbidities  Goal: Patient's chronic conditions and co-morbidity symptoms are monitored and maintained or improved  Outcome: Progressing     Problem: Skin  Goal: Decreased wound size/increased tissue granulation at next dressing change  Outcome: Progressing  Goal: Participates in plan/prevention/treatment measures  Outcome: Progressing  Flowsheets (Taken 6/18/2024 5313)  Participates in plan/prevention/treatment measures:   Increase activity/out of bed for meals   Elevate heels  Goal: Prevent/manage excess moisture  Outcome: Progressing  Goal: Prevent/minimize sheer/friction injuries  Outcome: Progressing  Goal: Promote/optimize nutrition  Outcome: Progressing  Goal: Promote skin healing  Outcome: Progressing

## 2024-06-18 NOTE — PROGRESS NOTES
"Marylu Amor is a 78 y.o. female on day 5 of admission presenting with Empyema (Multi).    Subjective   Patient was seen and examined at bedside. Patient is feeling better.     Objective     Physical Exam  General: Lying in bed without distress.  Cooperative.  Skin: No rashes or ulcerations.  HEENT: Sclera is white.  Mucous membranes dry.  Cardiac: Regular rate and rhythm, S1/S2 normal.  Lungs: clear to auscultation, no wheezing, no rhonchi appreciated   Abdomen: Soft, nontender, moderately obese abdomen, BS +  Extremities: No cyanosis.  No lower extremity edema.  Neurologic: Alert and oriented x3.  No focal deficits.  Psychiatric: Appropriate mood and behavior.  Currently no agitation.  Last Recorded Vitals  Blood pressure 158/75, pulse 95, temperature 36.6 °C (97.9 °F), temperature source Temporal, resp. rate 16, height 1.575 m (5' 2.01\"), weight 98.5 kg (217 lb 3.2 oz), SpO2 93%.  Intake/Output last 3 Shifts:  I/O last 3 completed shifts:  In: 700 (7.1 mL/kg) [P.O.:700]  Out: - (0 mL/kg)   Weight: 98.5 kg     Relevant Results  Scheduled medications  alteplase (Activase) 10 mg in sodium chloride 0.9% 50 mL syringe, 10 mg, intrapleural, Once  ampicillin-sulbactam, 3 g, intravenous, q6h  azithromycin, 500 mg, oral, q24h WILBERTO  dornase Alpha (Pulmozyme) 5 mg in sodium chloride 0.9% 50 mL syringe, 5 mg, intrapleural, Once  [Held by provider] enoxaparin, 40 mg, subcutaneous, q24h  lidocaine, 1 patch, transdermal, Daily  losartan 100 mg, hydroCHLOROthiazide 25 mg for Hyzaar 100/25, , oral, Daily  oxygen, , inhalation, Continuous - Inhalation  sennosides-docusate sodium, 2 tablet, oral, BID  sodium chloride 0.9 % 60 mL intrapleural syringe, , intrapleural, Once      Continuous medications     PRN medications  PRN medications: acetaminophen, albuterol, benzocaine-menthol, benzonatate, HYDROmorphone, hydrOXYzine HCL, ipratropium-albuteroL, ondansetron **OR** ondansetron, oxyCODONE, oxyCODONE     Assessment/Plan   Ms John " is a 78-year-old female with PMHx: HTN who [place] [facility].  Patient states she had had a moist productive cough for the past 3 weeks it has been worsening states it been hurting to cough and she been waking up in the middle of the night diaphoretic.  She went to the urgent care in [place] was found to have a loculated pneumonia and was transferred to [facility] where they started her on IV antibiotics and she was then transferred to [facility] [place] care.  Patient was started on azithromycin, Zosyn.  Thoracic surgery consulted.  Repeat CT chest done shows large multiloculated pleural effusion.  Thoracic surgery recommended interventional radiology for chest tube placement which patient received on 6/14.            Assessment/Plan   Acute hypoxemic respiratory failure/ multiloculated pleural effusion       1. Pleural fluid grew strep       2. Transition to unasyn        3. ID consult tomorrow for final antibiotics recs        4. CXR tomorrow morning        2. HTN     1. Continue home Losartan/hydrochlorothiazide           Disposition: appreciate Thoracic recs. Needs to be wean off oxygen and ID consult for discharge dispo      I spent 45 minutes in the professional and overall care of this patient.      Leida Wood DO

## 2024-06-19 ENCOUNTER — APPOINTMENT (OUTPATIENT)
Dept: RADIOLOGY | Facility: HOSPITAL | Age: 79
End: 2024-06-19
Payer: MEDICARE

## 2024-06-19 LAB
ALBUMIN SERPL BCP-MCNC: 3.2 G/DL (ref 3.4–5)
ANION GAP SERPL CALC-SCNC: 14 MMOL/L (ref 10–20)
BASOPHILS # BLD MANUAL: 0.1 X10*3/UL (ref 0–0.1)
BASOPHILS NFR BLD MANUAL: 0.9 %
BUN SERPL-MCNC: 14 MG/DL (ref 6–23)
CALCIUM SERPL-MCNC: 9 MG/DL (ref 8.6–10.6)
CHLORIDE SERPL-SCNC: 101 MMOL/L (ref 98–107)
CO2 SERPL-SCNC: 29 MMOL/L (ref 21–32)
CREAT SERPL-MCNC: 0.67 MG/DL (ref 0.5–1.05)
EGFRCR SERPLBLD CKD-EPI 2021: 90 ML/MIN/1.73M*2
EOSINOPHIL # BLD MANUAL: 0.55 X10*3/UL (ref 0–0.4)
EOSINOPHIL NFR BLD MANUAL: 5.2 %
ERYTHROCYTE [DISTWIDTH] IN BLOOD BY AUTOMATED COUNT: 14.2 % (ref 11.5–14.5)
GLUCOSE SERPL-MCNC: 135 MG/DL (ref 74–99)
HCT VFR BLD AUTO: 35.5 % (ref 36–46)
HGB BLD-MCNC: 11.1 G/DL (ref 12–16)
IMM GRANULOCYTES # BLD AUTO: 0.63 X10*3/UL (ref 0–0.5)
IMM GRANULOCYTES NFR BLD AUTO: 6 % (ref 0–0.9)
LYMPHOCYTES # BLD MANUAL: 1.84 X10*3/UL (ref 0.8–3)
LYMPHOCYTES NFR BLD MANUAL: 17.4 %
MAGNESIUM SERPL-MCNC: 2.32 MG/DL (ref 1.6–2.4)
MCH RBC QN AUTO: 28.4 PG (ref 26–34)
MCHC RBC AUTO-ENTMCNC: 31.3 G/DL (ref 32–36)
MCV RBC AUTO: 91 FL (ref 80–100)
METAMYELOCYTES # BLD MANUAL: 0.37 X10*3/UL
METAMYELOCYTES NFR BLD MANUAL: 3.5 %
MONOCYTES # BLD MANUAL: 0.55 X10*3/UL (ref 0.05–0.8)
MONOCYTES NFR BLD MANUAL: 5.2 %
MYELOCYTES # BLD MANUAL: 0.1 X10*3/UL
MYELOCYTES NFR BLD MANUAL: 0.9 %
NEUTS SEG # BLD MANUAL: 7.09 X10*3/UL (ref 1.6–5)
NEUTS SEG NFR BLD MANUAL: 66.9 %
NRBC BLD-RTO: 0 /100 WBCS (ref 0–0)
PHOSPHATE SERPL-MCNC: 2.9 MG/DL (ref 2.5–4.9)
PLATELET # BLD AUTO: 436 X10*3/UL (ref 150–450)
POTASSIUM SERPL-SCNC: 3.9 MMOL/L (ref 3.5–5.3)
RBC # BLD AUTO: 3.91 X10*6/UL (ref 4–5.2)
RBC MORPH BLD: ABNORMAL
SODIUM SERPL-SCNC: 140 MMOL/L (ref 136–145)
TOTAL CELLS COUNTED BLD: 115
WBC # BLD AUTO: 10.6 X10*3/UL (ref 4.4–11.3)

## 2024-06-19 PROCEDURE — 80069 RENAL FUNCTION PANEL: CPT | Performed by: INTERNAL MEDICINE

## 2024-06-19 PROCEDURE — 85027 COMPLETE CBC AUTOMATED: CPT | Performed by: INTERNAL MEDICINE

## 2024-06-19 PROCEDURE — 99221 1ST HOSP IP/OBS SF/LOW 40: CPT | Performed by: INTERNAL MEDICINE

## 2024-06-19 PROCEDURE — 85007 BL SMEAR W/DIFF WBC COUNT: CPT | Performed by: INTERNAL MEDICINE

## 2024-06-19 PROCEDURE — 2500000001 HC RX 250 WO HCPCS SELF ADMINISTERED DRUGS (ALT 637 FOR MEDICARE OP): Performed by: NURSE PRACTITIONER

## 2024-06-19 PROCEDURE — 71045 X-RAY EXAM CHEST 1 VIEW: CPT | Performed by: RADIOLOGY

## 2024-06-19 PROCEDURE — 83735 ASSAY OF MAGNESIUM: CPT | Performed by: INTERNAL MEDICINE

## 2024-06-19 PROCEDURE — 99232 SBSQ HOSP IP/OBS MODERATE 35: CPT | Performed by: INTERNAL MEDICINE

## 2024-06-19 PROCEDURE — 2500000005 HC RX 250 GENERAL PHARMACY W/O HCPCS: Performed by: STUDENT IN AN ORGANIZED HEALTH CARE EDUCATION/TRAINING PROGRAM

## 2024-06-19 PROCEDURE — 36415 COLL VENOUS BLD VENIPUNCTURE: CPT | Performed by: INTERNAL MEDICINE

## 2024-06-19 PROCEDURE — 97161 PT EVAL LOW COMPLEX 20 MIN: CPT | Mod: GP

## 2024-06-19 PROCEDURE — 0WJB3ZZ INSPECTION OF LEFT PLEURAL CAVITY, PERCUTANEOUS APPROACH: ICD-10-PCS | Performed by: PHYSICIAN ASSISTANT

## 2024-06-19 PROCEDURE — 1210000001 HC SEMI-PRIVATE ROOM DAILY

## 2024-06-19 PROCEDURE — 71045 X-RAY EXAM CHEST 1 VIEW: CPT

## 2024-06-19 PROCEDURE — 2500000004 HC RX 250 GENERAL PHARMACY W/ HCPCS (ALT 636 FOR OP/ED): Performed by: INTERNAL MEDICINE

## 2024-06-19 ASSESSMENT — COGNITIVE AND FUNCTIONAL STATUS - GENERAL
DRESSING REGULAR UPPER BODY CLOTHING: A LITTLE
DRESSING REGULAR LOWER BODY CLOTHING: A LITTLE
WALKING IN HOSPITAL ROOM: A LOT
MOBILITY SCORE: 19
HELP NEEDED FOR BATHING: A LITTLE
MOVING TO AND FROM BED TO CHAIR: A LITTLE
MOVING TO AND FROM BED TO CHAIR: A LITTLE
WALKING IN HOSPITAL ROOM: A LOT
DRESSING REGULAR LOWER BODY CLOTHING: A LITTLE
TOILETING: A LITTLE
WALKING IN HOSPITAL ROOM: A LOT
STANDING UP FROM CHAIR USING ARMS: A LITTLE
DAILY ACTIVITIY SCORE: 21
DRESSING REGULAR UPPER BODY CLOTHING: A LITTLE
CLIMB 3 TO 5 STEPS WITH RAILING: A LOT
STANDING UP FROM CHAIR USING ARMS: A LITTLE
MOVING TO AND FROM BED TO CHAIR: A LITTLE
DRESSING REGULAR LOWER BODY CLOTHING: A LITTLE
DRESSING REGULAR UPPER BODY CLOTHING: A LITTLE
MOVING TO AND FROM BED TO CHAIR: A LITTLE
CLIMB 3 TO 5 STEPS WITH RAILING: A LOT
STANDING UP FROM CHAIR USING ARMS: A LITTLE
DRESSING REGULAR LOWER BODY CLOTHING: A LITTLE
DRESSING REGULAR LOWER BODY CLOTHING: A LITTLE
CLIMB 3 TO 5 STEPS WITH RAILING: A LOT
WALKING IN HOSPITAL ROOM: A LOT
WALKING IN HOSPITAL ROOM: A LOT
DRESSING REGULAR UPPER BODY CLOTHING: A LITTLE
MOVING TO AND FROM BED TO CHAIR: A LITTLE
CLIMB 3 TO 5 STEPS WITH RAILING: A LOT
MOBILITY SCORE: 18
MOVING TO AND FROM BED TO CHAIR: A LITTLE
MOVING TO AND FROM BED TO CHAIR: A LITTLE
MOBILITY SCORE: 21
STANDING UP FROM CHAIR USING ARMS: A LITTLE
CLIMB 3 TO 5 STEPS WITH RAILING: A LOT
MOVING TO AND FROM BED TO CHAIR: A LITTLE
MOBILITY SCORE: 18
DRESSING REGULAR UPPER BODY CLOTHING: A LITTLE
STANDING UP FROM CHAIR USING ARMS: A LITTLE
WALKING IN HOSPITAL ROOM: A LITTLE
DAILY ACTIVITIY SCORE: 21
DRESSING REGULAR UPPER BODY CLOTHING: A LITTLE
CLIMB 3 TO 5 STEPS WITH RAILING: A LITTLE
WALKING IN HOSPITAL ROOM: A LOT
TOILETING: A LITTLE
MOBILITY SCORE: 18
DRESSING REGULAR LOWER BODY CLOTHING: A LITTLE
DAILY ACTIVITIY SCORE: 20
WALKING IN HOSPITAL ROOM: A LITTLE
TOILETING: A LITTLE
STANDING UP FROM CHAIR USING ARMS: A LITTLE
STANDING UP FROM CHAIR USING ARMS: A LITTLE
CLIMB 3 TO 5 STEPS WITH RAILING: A LOT
DRESSING REGULAR UPPER BODY CLOTHING: A LITTLE
DRESSING REGULAR LOWER BODY CLOTHING: A LITTLE
TOILETING: A LITTLE
DAILY ACTIVITIY SCORE: 21
CLIMB 3 TO 5 STEPS WITH RAILING: A LOT

## 2024-06-19 ASSESSMENT — PAIN - FUNCTIONAL ASSESSMENT
PAIN_FUNCTIONAL_ASSESSMENT: 0-10

## 2024-06-19 ASSESSMENT — PAIN SCALES - GENERAL
PAINLEVEL_OUTOF10: 0 - NO PAIN

## 2024-06-19 ASSESSMENT — ACTIVITIES OF DAILY LIVING (ADL): ADL_ASSISTANCE: INDEPENDENT

## 2024-06-19 NOTE — PROGRESS NOTES
6/19/24 1103 Transitional Care Coordinator Notes:    Per MD, chest tube has been removed and will begin to start weaning the patient off of oxygen. PT/OT orders placed to evaluate for discharge needs.                       Assessment/Plan   Principal Problem:    Empyema (Multi)  Active Problems:    Pneumonia of left lower lobe due to infectious organism               Rocío Solano RN

## 2024-06-19 NOTE — CONSULTS
Inpatient consult to Infectious Diseases  Consult performed by: Alysa Lamas MD  Consult ordered by: Leida Wood DO            Primary MD: No primary care provider on file.    Reason For Consult  length of antibiotics recommendations for empyema     History Of Present Illness  Marylu Amor is a 78 y.o. female     79 y/o female with a PmHx of HTN, COVID (2022), OA status post bilateral knee arthroplasty, eczema.  Admitted initially to [facility] on 6/11 with several week hx of productive cough w/ associated shortness of breath, chest pain, and night sweats. CXR showed left sided effusion/consolidation.  CT angio done 6/11/24 significant for LLL rounded masslike component measuring 3.9 x 3.7 cm as well as small partially loculated left pleural effusion.  Thoracentesis done on 6/12 - pleural fluid culture positive for Streptococcus intermedius.   Transferred to [facility] on 6/13, evaluated by thoracic surgery who recommended IR drainage, however per IR collection not amenable to drainage due to small size.  6/13 thoracentesis / chest tube / pigtail placement: 40 mL of serosanguineous/purulent colored fluid was removed and sent for fluid studies. The pigtail drainage catheter was then connected to a pleur-evac, which collected an additional 70 mL of serosanguineous colored fluid. Pleural fluid culture positive for Streptoccocus anginous group.     Patient seen and examined at bedside, accompanied by her .  States she feels significantly better, is now off oxygen and was able to ambulate without difficulty.  Still occasionally has nonproductive cough.  Denies any chest pain, shortness of breath, productive cough, nausea, vomiting, or diarrhea.      Past Medical History  She has a past medical history of History of transfusion and Hypertension.    Surgical History  She has a past surgical history that includes Hysterectomy; Eye surgery; and Joint replacement.     Social History     Occupational History    Not  on file   Tobacco Use    Smoking status: Former     Types: Cigarettes    Smokeless tobacco: Never   Substance and Sexual Activity    Alcohol use: Not Currently     Comment: just socially    Drug use: Never    Sexual activity: Defer     Travel History   Travel since 05/19/24    No documented travel since 05/19/24          Family History  Family History   Problem Relation Name Age of Onset    Hypertension Mother      Hypertension Father      Hypertension Sister       Allergies  Ciprofloxacin       There is no immunization history on file for this patient.  Medications  Home medications:  Medications Prior to Admission   Medication Sig Dispense Refill Last Dose    losartan-hydrochlorothiazide (Hyzaar) 100-25 mg tablet Take 1 tablet by mouth once daily.        Current medications:  Scheduled medications  alteplase (Activase) 10 mg in sodium chloride 0.9% 50 mL syringe, 10 mg, intrapleural, Once  ampicillin-sulbactam, 3 g, intravenous, q6h  azithromycin, 500 mg, oral, q24h WILBERTO  dornase Alpha (Pulmozyme) 5 mg in sodium chloride 0.9% 50 mL syringe, 5 mg, intrapleural, Once  [Held by provider] enoxaparin, 40 mg, subcutaneous, q24h  lidocaine, 1 patch, transdermal, Daily  losartan 100 mg, hydroCHLOROthiazide 25 mg for Hyzaar 100/25, , oral, Daily  oxygen, , inhalation, Continuous - Inhalation  sennosides-docusate sodium, 2 tablet, oral, BID  sodium chloride 0.9 % 60 mL intrapleural syringe, , intrapleural, Once      Continuous medications     PRN medications  PRN medications: acetaminophen, albuterol, benzocaine-menthol, benzonatate, HYDROmorphone, hydrOXYzine HCL, ipratropium-albuteroL, ondansetron **OR** ondansetron, oxyCODONE, oxyCODONE    Review of Systems     Objective  Range of Vitals (last 24 hours)  Heart Rate:  [83-95]   Temp:  [36.6 °C (97.9 °F)-36.8 °C (98.2 °F)]   Resp:  [16-17]   BP: (140-158)/(74-83)   SpO2:  [92 %-95 %]   Daily Weight  06/13/24 : 98.5 kg (217 lb 3.2 oz)    Body mass index is 39.72 kg/m².    "  Physical Exam     General: 78-year-old white female, sitting up off side of bed, conversant, in no acute distress  ENT: MMM, no oral lesions seen  CV: RRR, no murmurs heard  Lungs: diminished breath sounds at left base/small lung field, no wheezing or crackles appreciated.  On room air.  Nonlabored breathing.  Abdomen: Protuberant, nontender  Neuro: Speech clear, moving all 4 extremities.  Skin: Dorsum of right wrist with irregular area of mildly erythematous thickened skin with hyperkeratosis      Relevant Results    Labs  Results from last 72 hours   Lab Units 06/19/24  1034 06/18/24  0618   WBC AUTO x10*3/uL 10.6 9.4   HEMOGLOBIN g/dL 11.1* 10.5*   HEMATOCRIT % 35.5* 34.6*   PLATELETS AUTO x10*3/uL 436 452*     Results from last 72 hours   Lab Units 06/18/24  0619   SODIUM mmol/L 142   POTASSIUM mmol/L 3.8   CHLORIDE mmol/L 98   CO2 mmol/L 33*   BUN mg/dL 11   CREATININE mg/dL 0.69   GLUCOSE mg/dL 104*   CALCIUM mg/dL 8.6   ANION GAP mmol/L 15   EGFR mL/min/1.73m*2 90   PHOSPHORUS mg/dL 3.9     Results from last 72 hours   Lab Units 06/18/24  0619 06/17/24  0542   PROTEIN TOTAL g/dL  --  6.4   ALBUMIN g/dL 3.0*  --      Estimated Creatinine Clearance: 74.9 mL/min (by C-G formula based on SCr of 0.69 mg/dL).  No results found for: \"CRP\", \"SEDRATE\"  No results found for: \"HIV1X2\", \"HIVCONF\", \"MXCBDF6XK\"  No results found for: \"HEPCABINIT\", \"HEPCAB\", \"HCVPCRQUANT\"  Microbiology  Susceptibility data from last 90 days.  Collected Specimen Info Organism Ceftriaxone Penicillin Tetracycline Vancomycin   06/14/24 Fluid from Pleural Streptococcus anginosus group  S  S  S  S   Blood culture 6/14 x1 NGTD  Blood cultures collected 6/11/24 x2 from [facility] : negative, final     Pleural fluid culture 6/12 from [facility]: Rare Streptococcus intermedius    Cytospin Gram Stain Shows:    Few Gram Positive Cocci. Many white blood cells seen.     SUSCEPTIBILITY RESULTS:  Streptococcus intermedius   Antibiotic DAVID Dil   Ampicillin <0.25 "   Ceftriaxone <=0.12   Clindamycin >=1   Erythromycin >=8   Penicillin <=0.06   Tetracycline 0.5   Vancomycin 0.25     Streptococcus intermedius   Antibiotic DAVID Interp:  Ampicillin      Susceptible   Ceftriaxone    Susceptible   Clindamycin   Resistant   Erythromycin Resistant   Penicillin         Susceptible   Tetracycline    Susceptible   Vancomycin    Susceptible         Imaging      CT CHEST WO IV CONTRAST;  6/17/2024 7:00 pm  INDICATION:  Signs/Symptoms:loculated pleural effusion evaluatin.  IMPRESSION:  1.  Interval decrease in the multiloculated left pleural effusion  with adjacent atelectasis and improvement in overall left lung  aeration. The sterility of this fluid collection is unable to be  assessed. Recommend follow-up chest CT after appropriate treatment to  evaluate for resolution.  2. Improved trace right pleural effusion with interval development of  right middle lobe and lower lobe consolidations. These most likely  represent atelectasis in the setting of mucous plugging, however  correlate with concern for aspiration.      XR CHEST 1 VIEW; 6/15/2024 7:55 am    INDICATION:  Signs/Symptoms:AM upright.  IMPRESSION:  1.  Minimal improvement in loculated left-sided pleural effusion  status post pigtail catheter. Pneumothorax.        US THORACENTESIS WITH INSERTION OF TUBE INCLUDES WATER SEAL WHEN  PERFORMED 6/14/2024 10:34 am  Excerpt:   40 mL of serosanguineous/purulent colored fluid was removed and sent for fluid studies.   The pigtail drainage catheter was then connected to a pleur-evac, which collected an additional 70 mL of serosanguineous colored fluid.        CT CHEST W IV CONTRAST;  6/13/2024 2:38 pm    INDICATION:  Signs/Symptoms:Hypoxia pneumonia concern for empyema.  IMPRESSION:  1.  Large multiloculated left pleural effusion with adjacent  atelectasis. The sterility of this fluid collection is unable to be  assessed. No evidence of pleural thickening/enhancement. Recommend  follow-up chest  CT 4-6 weeks after appropriate treatment to evaluate  for resolution.  2. Trace right pleural effusion with right middle lobe and lower lobe  opacities favored to represent atelectasis.        THORACENTESIS UNDER ULTRASOUND [facility]  06/12/24 15:59:09   25 ml of cloudy yellow-green-colored pleural fluid with debris was removed with the assistance of vacuum bottles         CT Angiography Chest With Intravenous Contrast   FACILITY:   [facility]   FINDINGS:   Pulmonary arteries: Unremarkable.   No definitive evidence of pulmonary embolus.   Aorta: Enlarged left-sided periaortic mediastinal lymph node is increased in size measuring 18 x 12 mm. Stable mildly enlarged pretracheal lymph node measures 11 x 8 mm.   Lungs: There is airspace opacity and consolidation within the left lower lobe which could be associated with any combination of pneumonia and atelectasis. A more rounded masslike component of density is also seen within the left lower lobe inferiorly measuring 3.9 x 3.7 cm. Differential considerations would include that of a rounded component of pneumonia versus neoplasm.   Stable 1 mm right middle lobe micronodule.   Pleural space: Small partially loculated left pleural effusion including small loculated component of fluid within the left major fissure.   No pneumothorax.   Heart: Unremarkable. No cardiomegaly. No significant pericardial effusion. No evidence of RV dysfunction.   Bones/joints: Moderate multilevel degenerative changes within the mid and lower thoracic spine.   No acute fracture. No dislocation.   Soft tissues: Unremarkable.   Lymph nodes: See above.   Liver: Fatty infiltration of the liver.   Adrenals: Right adrenal nodule measures up to 1.5 x 1.5 cm and exhibits stability most suggesting benign etiology.   Kidneys and ureters: 2.1 cm angiomyolipoma within the upper pole of the right kidney.     IMPRESSION:   * No definitive evidence of pulmonary embolus.   * There is airspace opacity  and consolidation within the left lower lobe which could be associated with any combination of pneumonia and atelectasis. A more rounded masslike component of density is also seen within the left lower lobe inferiorly measuring 3.9 x 3.7 cm. Differential considerations would include that of a rounded component of pneumonia versus neoplasm.   * Recommend pulmonology consultation. Short-term follow-up chest CT evaluation could be considered following appropriate therapeutic course and 4-6 weeks. PET/CT follow-up evaluation is also an option.   * Small partially loculated left pleural effusion including small loculated component of fluid within the left major fissure.   * Enlarged left-sided periaortic mediastinal lymph node is increased in size measuring 18 x 12 mm. Stable mildly enlarged pretracheal lymph node measures 11 x 8 mm.   * Right adrenal nodule measures up to 1.5 x 1.5 cm and exhibits stability most suggesting benign etiology.   * 2.1 cm angiomyolipoma within the upper pole of the right kidney.       Antimicrobial therapy:  Pip-tazo ?6/13-6/18  Azithromycin 6/13-p  Ampicillin-sulbactam 6/18-p      Assessment/Plan     79 y/o female with a PmHx of HTN admitted initially to [facility] on 6/11 with several week hx of productive cough w/ associated chest pain and night sweats. CXR showed left sided effusion/consolidation.  CT angio done 6/11/24 significant for LLL rounded masslike component measuring 3.9 x 3.7 cm as well as small partially loculated left pleural effusion.  Thoracentesis done on 6/12 - pleural fluid culture positive for Streptococcus intermedius.   Transferred to [facility] on 6/13, evaluated by thoracic surgery who recommended IR drainage, however per IR collection not amenable to drainage due to small size.  6/13 thoracentesis / chest tube / pigtail placement: 40 mL of serosanguineous/purulent colored fluid was removed and sent for fluid studies. The pigtail drainage catheter was then connected to a  pleur-evac, which collected an additional 70 mL of serosanguineous colored fluid. Pleural fluid culture positive for Streptoccocus anginous group.     Impression:  Empyema due to Streptococcus intermedius    Recommendations:  Continue unasyn while inpatient.  When ready for DC please switch to PO augmentin 875/125mg PO q12 and continue for 4 week course. Will need follow up CT to assess for resolution of empyema/pneumonia.  Please ensure patient receives immunization for Streptococcus pneumoniae prior to DC if not already received. If unsure of vaccination history may give single dose PCV 20.      ID will sign off, please reach out with any questions or concerns.      Patient seen and discussed with Dr. Cotto.    Alysa Lamas MD  ID Fellow, PGY IV.  Pager Team A: [phone].

## 2024-06-19 NOTE — PROGRESS NOTES
"Marylu Amor is a 78 y.o. female on day 6 of admission presenting with Empyema (Multi).    Subjective   Aborted IR left thoracentesis 6/18 as the fluid collection was too small.     Patient feels well otherwise today, no shortness of breath. Required supplemental oxygen overnight they are attempting to wean today.     Objective     Physical Exam  Constitutional: NAD, sitting upright in the chair at the side of the bed  Neuro: A/O x4, no gross deficits   Psych: Normal mood and affect  HEENT: No deformities, no scleral icterus   Pulmonary: Breath sounds decreased throughout. Dressing to left chest removed, site without erythema or drainage.   Skin: Warm and dry  MSK: Moving all four extremities     Last Recorded Vitals  Blood pressure 149/83, pulse 83, temperature 36.6 °C (97.9 °F), temperature source Temporal, resp. rate 17, height 1.575 m (5' 2.01\"), weight 98.5 kg (217 lb 3.2 oz), SpO2 95%.    Intake/Output last 3 Shifts:  I/O last 3 completed shifts:  In: 700 (7.1 mL/kg) [P.O.:700]  Out: 100 (1 mL/kg) [Urine:100 (0 mL/kg/hr)]  Weight: 98.5 kg     Relevant Results  Scheduled medications  alteplase (Activase) 10 mg in sodium chloride 0.9% 50 mL syringe, 10 mg, intrapleural, Once  ampicillin-sulbactam, 3 g, intravenous, q6h  azithromycin, 500 mg, oral, q24h WILBERTO  dornase Alpha (Pulmozyme) 5 mg in sodium chloride 0.9% 50 mL syringe, 5 mg, intrapleural, Once  [Held by provider] enoxaparin, 40 mg, subcutaneous, q24h  lidocaine, 1 patch, transdermal, Daily  losartan 100 mg, hydroCHLOROthiazide 25 mg for Hyzaar 100/25, , oral, Daily  oxygen, , inhalation, Continuous - Inhalation  sennosides-docusate sodium, 2 tablet, oral, BID  sodium chloride 0.9 % 60 mL intrapleural syringe, , intrapleural, Once      Continuous medications     PRN medications  PRN medications: acetaminophen, albuterol, benzocaine-menthol, benzonatate, HYDROmorphone, hydrOXYzine HCL, ipratropium-albuteroL, ondansetron **OR** ondansetron, oxyCODONE, " oxyCODONE     Results for orders placed or performed during the hospital encounter of 06/13/24 (from the past 24 hour(s))   CBC and Auto Differential   Result Value Ref Range    WBC 10.6 4.4 - 11.3 x10*3/uL    nRBC 0.0 0.0 - 0.0 /100 WBCs    RBC 3.91 (L) 4.00 - 5.20 x10*6/uL    Hemoglobin 11.1 (L) 12.0 - 16.0 g/dL    Hematocrit 35.5 (L) 36.0 - 46.0 %    MCV 91 80 - 100 fL    MCH 28.4 26.0 - 34.0 pg    MCHC 31.3 (L) 32.0 - 36.0 g/dL    RDW 14.2 11.5 - 14.5 %    Platelets 436 150 - 450 x10*3/uL    Immature Granulocytes %, Automated 6.0 (H) 0.0 - 0.9 %    Immature Granulocytes Absolute, Automated 0.63 (H) 0.00 - 0.50 x10*3/uL   Renal Function Panel   Result Value Ref Range    Glucose 135 (H) 74 - 99 mg/dL    Sodium 140 136 - 145 mmol/L    Potassium 3.9 3.5 - 5.3 mmol/L    Chloride 101 98 - 107 mmol/L    Bicarbonate 29 21 - 32 mmol/L    Anion Gap 14 10 - 20 mmol/L    Urea Nitrogen 14 6 - 23 mg/dL    Creatinine 0.67 0.50 - 1.05 mg/dL    eGFR 90 >60 mL/min/1.73m*2    Calcium 9.0 8.6 - 10.6 mg/dL    Phosphorus 2.9 2.5 - 4.9 mg/dL    Albumin 3.2 (L) 3.4 - 5.0 g/dL   Magnesium   Result Value Ref Range    Magnesium 2.32 1.60 - 2.40 mg/dL   Manual Differential   Result Value Ref Range    Neutrophils %, Manual 66.9 40.0 - 80.0 %    Lymphocytes %, Manual 17.4 13.0 - 44.0 %    Monocytes %, Manual 5.2 2.0 - 10.0 %    Eosinophils %, Manual 5.2 0.0 - 6.0 %    Basophils %, Manual 0.9 0.0 - 2.0 %    Metamyelocytes %, Manual 3.5 0.0 - 0.0 %    Myelocytes %, Manual 0.9 0.0 - 0.0 %    Seg Neutrophils Absolute, Manual 7.09 (H) 1.60 - 5.00 x10*3/uL    Lymphocytes Absolute, Manual 1.84 0.80 - 3.00 x10*3/uL    Monocytes Absolute, Manual 0.55 0.05 - 0.80 x10*3/uL    Eosinophils Absolute, Manual 0.55 (H) 0.00 - 0.40 x10*3/uL    Basophils Absolute, Manual 0.10 0.00 - 0.10 x10*3/uL    Metamyelocytes Absolute, Manual 0.37 0.00 - 0.00 x10*3/uL    Myelocytes Absolute, Manual 0.10 0.00 - 0.00 x10*3/uL    Total Cells Counted 115     RBC Morphology No  significant RBC morphology present       XR chest 1 view    Result Date: 6/19/2024  Interpreted By:  Lazaro Rich, STUDY: XR CHEST 1 VIEW;  6/19/2024 5:38 am   INDICATION: Signs/Symptoms:loculated effusion.   COMPARISON: 06/18/2024   ACCESSION NUMBER(S): [id number]   ORDERING CLINICIAN: HI MOYER   FINDINGS: Upright portable chest.       CARDIOMEDIASTINAL SILHOUETTE: Cardiomediastinal silhouette is normal in size and configuration.   LUNGS: There are low lung volumes with left-sided pleural effusion and basilar atelectasis similar to findings on previous exam. The right chest shows stable basilar opacities. There is persistent minimal blunting of the right costophrenic angle which may be a small pleural effusion.   ABDOMEN: No remarkable upper abdominal findings.   BONES: No acute osseous changes.       1.  Stable left-sided pleural effusion. Bibasilar atelectatic changes. Possible trace right pleural effusion.       MACRO: None   Signed by: Lazaro Rich 6/19/2024 12:16 PM Dictation workstation:   [id number]    XR chest 1 view    Result Date: 6/19/2024  Interpreted By:  Yosef Warner, STUDY: XR CHEST 1 VIEW;  6/18/2024 6:13 am   INDICATION: Signs/Symptoms:Daily to assess pleural effusion/ empyema and pigtail.   COMPARISON: Radiograph and CT dated 06/17/2024   ACCESSION NUMBER(S): [id number]   ORDERING CLINICIAN: SHALINI MC   FINDINGS: AP radiograph of the chest was provided.   Stable left basilar opacities and blunting of the left costophrenic angle. Slight interval improvement in right basilar opacities. No evidence of pneumothorax.   CARDIOMEDIASTINAL SILHOUETTE: Cardiomediastinal silhouette is normal in size and configuration.   LUNGS: Lungs are clear.   ABDOMEN: No remarkable upper abdominal findings.   BONES: No acute osseous changes.       1.  Stable loculated small left pleural effusion with associated left basilar opacities. 2. Interval improvement in right basilar opacities with  persistent blunting of the right costophrenic angle that may represent atelectasis. Small right pleural effusion is not excluded.       MACRO: None   Signed by: Yosef Warner 6/19/2024 10:30 AM Dictation workstation:   [id number]    CT chest wo IV contrast    Result Date: 6/19/2024  Interpreted By:  Yosef Warner and Hofer Lindsay STUDY: CT CHEST WO IV CONTRAST;  6/17/2024 7:00 pm   INDICATION: Signs/Symptoms:loculated pleural effusion evaluatin.   COMPARISON: CT chest 06/13/2024   ACCESSION NUMBER(S): [id number]   ORDERING CLINICIAN: HI MOYER   TECHNIQUE: Helical data acquisition of the chest was obtained  without IV contrast material.  Images were reformatted in axial, coronal, and sagittal planes.   FINDINGS: LUNGS AND AIRWAYS: The trachea and large central airways are patent. Multiple filling defects within right-greater-than-left lower lobe airways. Otherwise, no endobronchial lesion.   Interval decrease in the size of the loculated left pleural effusion with adjacent atelectasis, however pulmonary consolidations in the left lung have significantly improved. There are still several areas of loculated fluid throughout the left lung, with the largest loculated fluid collection in the left lower lung. Interval development right middle lobe and lower lobe consolidation with associated right-sided volume loss. No pulmonary nodules within the visualized lung fields. No pneumothorax.   MEDIASTINUM AND MARY ANNE, LOWER NECK AND AXILLA: The visualized thyroid gland is within normal limits.   No evidence of thoracic lymphadenopathy by CT criteria. Similar appearance of a few small subcentimeter mediastinal lymph nodes, felt to be reactive.   Esophagus appears within normal limits as seen. Questionable small hiatal hernia.   HEART AND VESSELS: The thoracic aorta is of normal course and caliber with mild vascular calcifications.   Main pulmonary artery and its branches are normal in caliber.   Mild coronary artery  calcifications. The study is not optimized for evaluation of coronary arteries.   The cardiac chambers are not enlarged.   No evidence of pericardial effusion.   UPPER ABDOMEN: The visualized subdiaphragmatic structures demonstrate no remarkable findings.   CHEST WALL AND OSSEOUS STRUCTURES: There are no suspicious osseous lesions. Multilevel degenerative changes are present       1.  Interval decrease in the multiloculated left pleural effusion with adjacent atelectasis and improvement in overall left lung aeration. The sterility of this fluid collection is unable to be assessed. Recommend follow-up chest CT after appropriate treatment to evaluate for resolution. 2. Improved trace right pleural effusion with interval development of right middle lobe and lower lobe consolidations. These most likely represent atelectasis in the setting of mucous plugging, however correlate with concern for aspiration.   I personally reviewed the images/study and resident's interpretation and I agree with the findings as stated by Hazel Cohen MD (resident radiologist). This study was analyzed and interpreted at [place], [place].   MACRO: None   Signed by: Yosef Warner 6/19/2024 10:04 AM Dictation workstation:   [id number]    US chest    Result Date: 6/17/2024  Interpreted By:  Bharati Doan and Liller Gregory STUDY: US CHEST  6/17/2024 2:51 pm   INDICATION: 77 y/o   F with  Signs/Symptoms:needs pig tail chest tube place back on.   COMPARISON: Chest radiograph 06/17/2024, ultrasound guided thoracentesis 06/14/2024   ACCESSION NUMBER(S): [id number]   ORDERING CLINICIAN: HI MOYER   TECHNIQUE: Limited ultrasound of the left lower lung was performed.  Static images were obtained for remote interpretation.   FINDINGS: The patient was brought back to the ultrasound IR suite for attempted replacement of left lower chest pigtail catheter. Upon initial limited ultrasound imaging of the  left lower chest, there has been significant interval decrease in size of the previously noted loculated left basilar pleural effusion when compared to prior ultrasound-guided thoracentesis imaging from 06/14/2024. This is likely result of the previously placed chest tube.   No significant fluid collection identified. Therefore, no intervention was done.       Significant interval decrease in size of the previously noted loculated left sided effusion when compared to prior ultrasound on 06/14/2024 status post chest tube placement. Therefore, no intervention was done given the small residual left-sided effusion.   I personally reviewed the images/study and I agree with the findings as stated above by resident physician, Dr. Lenard Johnson. The study was interpreted at [facility] in [facility].   MACRO: None   Signed by: Bharati Doan 6/17/2024 9:33 PM Dictation workstation:   [id number]      Assessment/Plan   Principal Problem:    Empyema (Multi)  Active Problems:    Pneumonia of left lower lobe due to infectious organism    Marylu Amor is a 78 y.o. female with a past medical history of HTN and remote smoking hx (50 years ago, 2 py) who has had a cough for the past 3 weeks with chest pain and diaphoresis. She was seen at [facility] with leukocytosis to 19.0, and a CT showing a rounded masslike component in the LLL with concern of neoplasm. She was admitted for treatment of the loculated lower lobe effusion. Repeat CT confirming large multiloculated left pleural effusion without thickening or enhancement, no mass noted. Thoracic surgery consulted for recommendations on management.    Left pigtail placed by IR on 6/14 and one dose of lytic therapy was administered by thoracic surgery. Lytic therapy was attempted again on 6/15, but the pigtail was clogged. On 6/16, the pigtail was almost out of the chest.    Pigtail removed 6/17 as it was completely in the subcutaneous  tissues. A post pull CXR was stable. IR also attempted to replace the drain later that day, but no substantial fluid collection was available for drain placement, so a new CT scan was obtained.    Plan:   -CT chest reviewed with Dr. Petersen, IR resident Dr. Lenard Johnson, and Dr. Bharati Doan today. Posterior fluid collection is still too small for a new IR drain.   -Continue to monitor patient's symptoms clinically  -Repeat CXR today is stable   -Wean supplemental oxygen as able  -Antibiotics per primary team  -Rest of care per the primary team    Pt seen and evaluated. Pt discussed with Dr. Petersen. We will follow along peripherally and recommend follow up with thoracic surgery 2 weeks post discharge, appt scheduled.      Thelma Maldonado PA-C

## 2024-06-19 NOTE — PROGRESS NOTES
Physical Therapy    Physical Therapy Evaluation    Patient Name: Marylu Amor  MRN: [id number]  Today's Date: 6/19/2024   Time Calculation  Start Time: 1300  Stop Time: 1314  Time Calculation (min): 14 min    Assessment/Plan   PT Assessment  PT Assessment Results: Decreased endurance, Impaired balance, Decreased mobility  Rehab Prognosis: Good  Evaluation/Treatment Tolerance: Patient tolerated treatment well (limited be destatting)  Medical Staff Made Aware: Yes  Strengths: Ability to acquire knowledge, Attitude of self  End of Session Communication: Bedside nurse  Assessment Comment: Pt limited in ambulation 2/2 destatting to lowest 80% after ambulation. Pt unable to recover higher than 84% after 1 min so 2L re donned and pt able to quickly recover to above 90%. Pt able to ambulate 60ft with IV pole and CGA. Patient would benefit from skilled physical therapy to maximize functional mobility and safety. Pt is appropriate for low intensity therapy with outpatient PT when medically appropriate for DC.  End of Session Patient Position:  (pt sitting EOB;  present and CB in reach)  IP OR SWING BED PT PLAN  Inpatient or Swing Bed: Inpatient  PT Plan  Treatment/Interventions: Bed mobility, Transfer training, Gait training, Balance training, Endurance training, Therapeutic exercise, Therapeutic activity, Stair training  PT Plan: Ongoing PT  PT Frequency: 3 times per week  PT Discharge Recommendations: Low intensity level of continued care (outpatient PT)  Equipment Recommended upon Discharge:  (TBD)  PT Recommended Transfer Status: Assist x1  PT - OK to Discharge: Yes (meaning pt seen and dc rec made)  RN cleared prior to session    Subjective   General Visit Information:  General  Reason for Referral: 78 y.o F transferred from [facility] for Pneumonia of left lower lobe due to infectious organism. CT scan showed a rounded masslike component in the LLL which was suspicious for neoplasm. Left pigtail placed by IR on 6/14 and  removed 6/17  Past Medical History Relevant to Rehab: HTN  Family/Caregiver Present: Yes  Caregiver Feedback:  present and supportive  Prior to Session Communication: Bedside nurse  Patient Position Received:  (pt sitting EOB)  Preferred Learning Style: auditory, verbal  General Comment: Pt pleasant and agreeable to therapy  Home Living:  Home Living  Type of Home: House  Lives With: Spouse  Home Adaptive Equipment: None  Home Layout: One level  Home Access: Stairs to enter without rails  Entrance Stairs-Rails: None  Entrance Stairs-Number of Steps: 2  Bathroom Shower/Tub: Walk-in shower  Bathroom Equipment: None  Prior Level of Function:  Prior Function Per Pt/Caregiver Report  Level of [place]: Independent with homemaking with ambulation, Independent with ADLs and functional transfers  Receives Help From:  ()  ADL Assistance: Independent  Homemaking Assistance: Independent  Ambulatory Assistance: Independent  Prior Function Comments: Pt states she was IND in all ADLs and IADLs prior to admit. Pt used no AD for ambulation  Precautions:  Precautions  Medical Precautions: Fall precautions  Precautions Comment: Pt taken off O2 right before PT- instructed she was able to us 2 L as needed  Vital Signs:  Vital Signs  SpO2:  (pre 99, 94; lowest during 80; immediately after pt recovered to 84 w/o O2 after 1 min 2L redoneed and pt recovered to 90% in 30 seconds; post 94)    Objective   Pain:  Pain Assessment  Pain Assessment: 0-10  0-10 (Numeric) Pain Score: 0 - No pain  Cognition:  Cognition  Overall Cognitive Status: Within Functional Limits  Arousal/Alertness: Appropriate responses to stimuli  Orientation Level: Oriented X4  Following Commands: Follows all commands and directions without difficulty    General Assessments:  Activity Tolerance  Endurance: Endurance does not limit participation in activity    Sensation  Light Touch: No apparent deficits    Strength  Strength Comments: BLE grossly  >3+/5  Coordination  Movements are Fluid and Coordinated: Yes    Postural Control  Postural Control: Within Functional Limits    Static Sitting Balance  Static Sitting-Balance Support: No upper extremity supported, Feet supported  Static Sitting-Level of Assistance: Modified independent    Static Standing Balance  Static Standing-Balance Support: No upper extremity supported  Static Standing-Level of Assistance: Contact guard  Functional Assessments:  Bed Mobility  Bed Mobility: No (pt sitting EOB pre and post session)    Transfers  Transfer: Yes  Transfer 1  Transfer From 1: Sit to, Stand to  Transfer to 1: Stand, Sit  Technique 1: Sit to stand, Stand to sit  Transfer Device 1:  (none)  Transfer Level of Assistance 1: Contact guard  Trials/Comments 1: min vc for safety    Ambulation/Gait Training  Ambulation/Gait Training Performed: Yes  Ambulation/Gait Training 1  Surface 1: Level tile  Device 1: IV Pole  Assistance 1: Contact guard  Quality of Gait 1: Diminished heel strike, Decreased step length, Inconsistent stride length  Comments/Distance (ft) 1: 60ft; pt destatting with ambulation; min vc for safety and breathing  Extremity/Trunk Assessments:  RLE   RLE : Exceptions to WFL  Strength RLE  RLE Overall Strength: Greater than or equal to 3/5 as evidenced by functional mobility  LLE   LLE : Exceptions to WFL  Strength LLE  LLE Overall Strength: Greater than or equal to 3/5 as evidenced by functional mobility  Outcome Measures:  [facility] Basic Mobility  Turning from your back to your side while in a flat bed without using bedrails: None  Moving from lying on your back to sitting on the side of a flat bed without using bedrails: None  Moving to and from bed to chair (including a wheelchair): A little  Standing up from a chair using your arms (e.g. wheelchair or bedside chair): A little  To walk in hospital room: A little  Climbing 3-5 steps with railing: A lot  Basic Mobility - Total Score: 19    Encounter Problems        Encounter Problems (Active)       PT Problem       Patient will complete supine to sit and sit to supine Independent        Start:  06/19/24    Expected End:  07/03/24            Patient will ambulate >150' with LRAD and Modified Independent        Start:  06/19/24    Expected End:  07/03/24            Patient will perform sit<>stand transfer with LRAD, and Modified Independent        Start:  06/19/24    Expected End:  07/03/24            Patient will ascend/descend 2 steps with No Rails and SBA        Start:  06/19/24    Expected End:  07/03/24               Pain - Adult              Education Documentation  Precautions, taught by Joann Nam, PT at 6/19/2024  2:48 PM.  Learner: Patient  Readiness: Acceptance  Method: Explanation, Demonstration  Response: Verbalizes Understanding, Needs Reinforcement    Body Mechanics, taught by Joann Nam PT at 6/19/2024  2:48 PM.  Learner: Patient  Readiness: Acceptance  Method: Explanation, Demonstration  Response: Verbalizes Understanding, Needs Reinforcement    Mobility Training, taught by Joann Nam PT at 6/19/2024  2:48 PM.  Learner: Patient  Readiness: Acceptance  Method: Explanation, Demonstration  Response: Verbalizes Understanding, Needs Reinforcement    Education Comments  No comments found.

## 2024-06-19 NOTE — CARE PLAN
Problem: Respiratory  Goal: Minimize anxiety/maximize coping throughout shift  Outcome: Progressing  Goal: Minimal/no exertional discomfort or dyspnea this shift  Outcome: Progressing  Goal: No signs of respiratory distress (eg. Use of accessory muscles. Peds grunting)  Outcome: Progressing  Goal: Tolerate pulmonary toileting this shift  Outcome: Progressing  Goal: Increase self care and/or family involvement in next 24 hours  Outcome: Progressing     Problem: Fall/Injury  Goal: Use assistive devices by end of the shift  Outcome: Progressing   The patient's goals for the shift include      The clinical goals for the shift include pt will tolerate room air

## 2024-06-20 ENCOUNTER — DOCUMENTATION (OUTPATIENT)
Dept: HOME HEALTH SERVICES | Facility: HOME HEALTH | Age: 79
End: 2024-06-20
Payer: MEDICARE

## 2024-06-20 ENCOUNTER — PHARMACY VISIT (OUTPATIENT)
Dept: PHARMACY | Facility: CLINIC | Age: 79
End: 2024-06-20
Payer: COMMERCIAL

## 2024-06-20 ENCOUNTER — HOME HEALTH ADMISSION (OUTPATIENT)
Dept: HOME HEALTH SERVICES | Facility: HOME HEALTH | Age: 79
End: 2024-06-20
Payer: MEDICARE

## 2024-06-20 ENCOUNTER — APPOINTMENT (OUTPATIENT)
Dept: RADIOLOGY | Facility: HOSPITAL | Age: 79
End: 2024-06-20
Payer: MEDICARE

## 2024-06-20 VITALS
RESPIRATION RATE: 17 BRPM | HEIGHT: 62 IN | OXYGEN SATURATION: 96 % | BODY MASS INDEX: 39.97 KG/M2 | HEART RATE: 82 BPM | DIASTOLIC BLOOD PRESSURE: 89 MMHG | WEIGHT: 217.2 LBS | TEMPERATURE: 97.7 F | SYSTOLIC BLOOD PRESSURE: 166 MMHG

## 2024-06-20 LAB
ALBUMIN SERPL BCP-MCNC: 3.6 G/DL (ref 3.4–5)
ANION GAP SERPL CALC-SCNC: 17 MMOL/L (ref 10–20)
BUN SERPL-MCNC: 14 MG/DL (ref 6–23)
CALCIUM SERPL-MCNC: 8.9 MG/DL (ref 8.6–10.6)
CHLORIDE SERPL-SCNC: 101 MMOL/L (ref 98–107)
CO2 SERPL-SCNC: 24 MMOL/L (ref 21–32)
CREAT SERPL-MCNC: 0.74 MG/DL (ref 0.5–1.05)
EGFRCR SERPLBLD CKD-EPI 2021: 83 ML/MIN/1.73M*2
ERYTHROCYTE [DISTWIDTH] IN BLOOD BY AUTOMATED COUNT: 14.5 % (ref 11.5–14.5)
GLUCOSE SERPL-MCNC: 84 MG/DL (ref 74–99)
HCT VFR BLD AUTO: 39.1 % (ref 36–46)
HGB BLD-MCNC: 11.9 G/DL (ref 12–16)
MCH RBC QN AUTO: 28.3 PG (ref 26–34)
MCHC RBC AUTO-ENTMCNC: 30.4 G/DL (ref 32–36)
MCV RBC AUTO: 93 FL (ref 80–100)
NRBC BLD-RTO: 0 /100 WBCS (ref 0–0)
PHOSPHATE SERPL-MCNC: 3.5 MG/DL (ref 2.5–4.9)
PLATELET # BLD AUTO: 429 X10*3/UL (ref 150–450)
POTASSIUM SERPL-SCNC: 4.1 MMOL/L (ref 3.5–5.3)
RBC # BLD AUTO: 4.2 X10*6/UL (ref 4–5.2)
SODIUM SERPL-SCNC: 138 MMOL/L (ref 136–145)
WBC # BLD AUTO: 10.4 X10*3/UL (ref 4.4–11.3)

## 2024-06-20 PROCEDURE — 2500000005 HC RX 250 GENERAL PHARMACY W/O HCPCS: Performed by: STUDENT IN AN ORGANIZED HEALTH CARE EDUCATION/TRAINING PROGRAM

## 2024-06-20 PROCEDURE — 85027 COMPLETE CBC AUTOMATED: CPT | Performed by: INTERNAL MEDICINE

## 2024-06-20 PROCEDURE — 80069 RENAL FUNCTION PANEL: CPT | Performed by: INTERNAL MEDICINE

## 2024-06-20 PROCEDURE — 36415 COLL VENOUS BLD VENIPUNCTURE: CPT | Performed by: INTERNAL MEDICINE

## 2024-06-20 PROCEDURE — 71045 X-RAY EXAM CHEST 1 VIEW: CPT | Performed by: RADIOLOGY

## 2024-06-20 PROCEDURE — RXMED WILLOW AMBULATORY MEDICATION CHARGE

## 2024-06-20 PROCEDURE — 2500000004 HC RX 250 GENERAL PHARMACY W/ HCPCS (ALT 636 FOR OP/ED): Performed by: INTERNAL MEDICINE

## 2024-06-20 PROCEDURE — 2500000001 HC RX 250 WO HCPCS SELF ADMINISTERED DRUGS (ALT 637 FOR MEDICARE OP): Performed by: NURSE PRACTITIONER

## 2024-06-20 PROCEDURE — 97165 OT EVAL LOW COMPLEX 30 MIN: CPT | Mod: GO

## 2024-06-20 PROCEDURE — 71045 X-RAY EXAM CHEST 1 VIEW: CPT

## 2024-06-20 RX ORDER — AMOXICILLIN AND CLAVULANATE POTASSIUM 875; 125 MG/1; MG/1
1 TABLET, FILM COATED ORAL 2 TIMES DAILY
Qty: 56 TABLET | Refills: 0 | Status: SHIPPED | OUTPATIENT
Start: 2024-06-20 | End: 2024-07-18

## 2024-06-20 RX ORDER — BENZONATATE 100 MG/1
100 CAPSULE ORAL 3 TIMES DAILY PRN
Qty: 20 CAPSULE | Refills: 0 | Status: SHIPPED | OUTPATIENT
Start: 2024-06-20 | End: 2024-06-30

## 2024-06-20 ASSESSMENT — COGNITIVE AND FUNCTIONAL STATUS - GENERAL
DRESSING REGULAR UPPER BODY CLOTHING: A LITTLE
TOILETING: A LITTLE
DAILY ACTIVITIY SCORE: 21
DRESSING REGULAR LOWER BODY CLOTHING: A LITTLE
WALKING IN HOSPITAL ROOM: A LOT
TOILETING: A LITTLE
DRESSING REGULAR UPPER BODY CLOTHING: A LITTLE
DAILY ACTIVITIY SCORE: 21
TOILETING: A LITTLE
CLIMB 3 TO 5 STEPS WITH RAILING: A LOT
DRESSING REGULAR LOWER BODY CLOTHING: A LOT
STANDING UP FROM CHAIR USING ARMS: A LITTLE
MOVING TO AND FROM BED TO CHAIR: A LITTLE
CLIMB 3 TO 5 STEPS WITH RAILING: A LOT
HELP NEEDED FOR BATHING: A LITTLE
MOVING TO AND FROM BED TO CHAIR: A LITTLE
STANDING UP FROM CHAIR USING ARMS: A LITTLE
DAILY ACTIVITIY SCORE: 18
PERSONAL GROOMING: A LITTLE
WALKING IN HOSPITAL ROOM: A LOT
MOBILITY SCORE: 18
DRESSING REGULAR UPPER BODY CLOTHING: A LITTLE
MOBILITY SCORE: 18
DRESSING REGULAR LOWER BODY CLOTHING: A LITTLE

## 2024-06-20 ASSESSMENT — PAIN - FUNCTIONAL ASSESSMENT
PAIN_FUNCTIONAL_ASSESSMENT: 0-10
PAIN_FUNCTIONAL_ASSESSMENT: 0-10

## 2024-06-20 ASSESSMENT — PAIN SCALES - GENERAL
PAINLEVEL_OUTOF10: 0 - NO PAIN
PAINLEVEL_OUTOF10: 0 - NO PAIN

## 2024-06-20 ASSESSMENT — ACTIVITIES OF DAILY LIVING (ADL)
ADL_ASSISTANCE: INDEPENDENT
BATHING_ASSISTANCE: MINIMAL

## 2024-06-20 NOTE — SIGNIFICANT EVENT
Thoracic Surgery Follow Up Note    No significant respiratory changes since removal of pigtail drain.  Repeat CXR shows no changes.      Thoracic Surgery will sign off.  Please page service pager #[phone] with changes in pt's status

## 2024-06-20 NOTE — CARE PLAN
Problem: Fall/Injury  Goal: Use assistive devices by end of the shift  Outcome: Progressing     Problem: Respiratory  Goal: Minimize anxiety/maximize coping throughout shift  Outcome: Progressing  Goal: Minimal/no exertional discomfort or dyspnea this shift  Outcome: Progressing  Goal: No signs of respiratory distress (eg. Use of accessory muscles. Peds grunting)  Outcome: Progressing  Goal: Tolerate pulmonary toileting this shift  Outcome: Progressing  Goal: Increase self care and/or family involvement in next 24 hours  Outcome: Progressing   The patient's goals for the shift include      The clinical goals for the shift include pt will remain o2 saturation above 92

## 2024-06-20 NOTE — PROGRESS NOTES
06/20/24 1218   Discharge Planning   Home or Post Acute Services In home services   Type of Home Care Services Home OT;Home PT   Patient expects to be discharged to: [facility]   Does the patient need discharge transport arranged? No       Patient is medically ready to discharge. PT is recommending low intensity therapy and patient is agreeable. Referral placed with [facility], final home care orders received. Patient spouse will provide transportation home.    Patient is declining home care services. Will discharge home with outpatient therapy. Patient given a script.

## 2024-06-20 NOTE — NURSING NOTE
Patient discharged to home. She verbalized an understanding of the AVS after review with this nurse. When reviewing the AVS, she declined home care for therapy and wanted outpatient therapy. A prescription was then given to her for outpatient therapy. She received meds to bed for 2 prescriptions from [facility] pharmacy. She packed all her belongings. She did not have an IV in to remove. Her  was here and transported her home.

## 2024-06-20 NOTE — HH CARE COORDINATION
Home Care received a Referral for Physical Therapy and Occupational Therapy. We have processed the referral for a Start of Care on 6.21-22.24.     If you have any questions or concerns, please feel free to contact us at [phone]. Follow the prompts, enter your five digit zip code, and you will be directed to your care team on WE[facility] 3.

## 2024-06-20 NOTE — CARE PLAN
The patient's goals for the shift include      The clinical goals for the shift include pt will tolerate room air    Problem: Fall/Injury  Goal: Use assistive devices by end of the shift  Outcome: Progressing     Problem: Respiratory  Goal: Minimize anxiety/maximize coping throughout shift  Outcome: Progressing  Goal: Minimal/no exertional discomfort or dyspnea this shift  Outcome: Progressing  Goal: No signs of respiratory distress (eg. Use of accessory muscles. Peds grunting)  Outcome: Progressing  Goal: Tolerate pulmonary toileting this shift  Outcome: Progressing  Goal: Increase self care and/or family involvement in next 24 hours  Outcome: Progressing     Problem: Pain - Adult  Goal: Verbalizes/displays adequate comfort level or baseline comfort level  Outcome: Progressing     Problem: Discharge Planning  Goal: Discharge to home or other facility with appropriate resources  Outcome: Progressing     Problem: Chronic Conditions and Co-morbidities  Goal: Patient's chronic conditions and co-morbidity symptoms are monitored and maintained or improved  Outcome: Progressing     Problem: Skin  Goal: Decreased wound size/increased tissue granulation at next dressing change  Outcome: Progressing  Goal: Participates in plan/prevention/treatment measures  Outcome: Progressing  Goal: Prevent/manage excess moisture  Outcome: Progressing  Goal: Prevent/minimize sheer/friction injuries  Outcome: Progressing  Goal: Promote/optimize nutrition  Outcome: Progressing  Goal: Promote skin healing  Outcome: Progressing

## 2024-06-20 NOTE — PROGRESS NOTES
Occupational Therapy    Evaluation    Patient Name: Marylu Amor  MRN: [id number]  Today's Date: 6/20/2024  Time Calculation  Start Time: 1216  Stop Time: 1230  Time Calculation (min): 14 min        Assessment:  Evaluation/Treatment Tolerance: Patient tolerated treatment well  End of Session Communication: Bedside nurse  End of Session Patient Position: Up in chair, Alarm off, not on at start of session  Evaluation/Treatment Tolerance: Patient tolerated treatment well  Plan:  Treatment Interventions: ADL retraining, Functional transfer training  OT Frequency: 2 times per week  OT Discharge Recommendations: Low intensity level of continued care  OT - OK to Discharge: Yes (OT eval complete; refer to discharge recommendations provided)  Treatment Interventions: ADL retraining, Functional transfer training    Subjective   Current Problem:  1. Empyema (Multi)  benzonatate (Tessalon) 100 mg capsule    amoxicillin-pot clavulanate (Augmentin) 875-125 mg tablet    Referral to Home Care      2. Pneumonia of left lower lobe due to infectious organism  Referral to Home Care        General:  General  Reason for Referral: Pneumonia. Left pigtail placed by IR on 6/14 and removed 6/17  Past Medical History Relevant to Rehab: HTN  Family/Caregiver Present: No  Patient Position Received: Bed, 3 rail up (sitting at EOB)  Preferred Learning Style: verbal  General Comment: Pt agreeable to participate in therapy  Precautions:  Medical Precautions: Fall precautions  Vital Signs:  SpO2: 96 %  Pain:  Pain Assessment  Pain Assessment: 0-10  0-10 (Numeric) Pain Score: 0 - No pain    Objective   Cognition:  Overall Cognitive Status: Within Functional Limits  Orientation Level: Oriented X4  Following Commands: Follows all commands and directions without difficulty  Attention: Within Functional Limits  Safety/Judgement: Within Functional Limits  Insight: Within function limits  Impulsive: Within functional limits  Processing Speed: Within  funtional limits           Home Living:  Type of Home: House  Lives With: Spouse  Home Adaptive Equipment: None  Home Layout: One level  Home Access: Stairs to enter without rails  Bathroom Shower/Tub: Walk-in shower  Bathroom Equipment: Built-in shower seat  Prior Function:  Level of [place]: Independent with ADLs and functional transfers, Independent with homemaking with ambulation  ADL Assistance: Independent  Homemaking Assistance: Independent  Ambulatory Assistance: Independent  Leisure: enjoys traveling  Hand Dominance: Right  Prior Function Comments: +drives     ADL:  Eating Assistance: Independent  Grooming Assistance: Independent  Bathing Assistance: Minimal  UE Dressing Assistance: Stand by  LE Dressing Assistance: Moderate  Toileting Assistance with Device: Minimal  Activity Tolerance:  Endurance: Tolerates 10 - 20 min exercise with multiple rests  Bed Mobility/Transfers: Bed Mobility  Bed Mobility: No    Transfers  Transfer: Yes  Transfer 1  Transfer From 1: Bed to  Technique 1: Sit to stand, Stand to sit  Transfer Level of Assistance 1: Contact guard, Hand held assistance  Transfers 2  Transfer From 2: Stand to  Transfer to 2: Chair with arms  Transfer Level of Assistance 2: Close supervision  Transfers 3  Transfer to 3: Commode-standard  Technique 3: Sit to stand, Stand to sit  Transfer Level of Assistance 3: Close supervision    Functional Mobility:  Functional Mobility  Functional Mobility Performed: Yes  Functional Mobility 1  Surface 1: Level tile  Assistance 1: Contact guard, Hand held assistance  Sitting Balance:  Static Sitting Balance  Static Sitting-Level of Assistance: Independent  Dynamic Sitting Balance  Dynamic Sitting-Balance Support: Feet supported  Dynamic Sitting-Comments: SBA  Standing Balance:  Static Standing Balance  Static Standing-Comment/Number of Minutes: SBA  Dynamic Standing Balance  Dynamic Standing-Comments: CGA   Modalities:     Vision:Vision - Basic  Assessment  Current Vision: No visual deficits    Sensation:  Light Touch: No apparent deficits  Strength:  Strength Comments: BUE grossly 4+/5  Perception:     Coordination:  Movements are Fluid and Coordinated: Yes   Hand Function:  Gross Grasp: Functional  Coordination: Functional  Extremities: RUE   RUE : Within Functional Limits and LUE   LUE: Within Functional Limits      Outcome Measures:[facility] Daily Activity  Putting on and taking off regular lower body clothing: A lot  Bathing (including washing, rinsing, drying): A little  Putting on and taking off regular upper body clothing: A little  Toileting, which includes using toilet, bedpan or urinal: A little  Taking care of personal grooming such as brushing teeth: A little  Eating Meals: None  Daily Activity - Total Score: 18    Education Documentation  Body Mechanics, taught by Lyric Hi OT at 6/20/2024 12:49 PM.  Learner: Patient  Readiness: Acceptance  Method: Explanation  Response: Needs Reinforcement  Comment: Pt education provided on use of sock aide and reacher to assist with LB dressing.    ADL Training, taught by Lyric Hi OT at 6/20/2024 12:49 PM.  Learner: Patient  Readiness: Acceptance  Method: Explanation  Response: Needs Reinforcement  Comment: Pt education provided on use of sock aide and reacher to assist with LB dressing.    Education Comments  No comments found.        OP EDUCATION:       Goals:  Encounter Problems       Encounter Problems (Active)       ADLs       Patient with complete lower body dressing with modified independent level of assistance donning pants without a zipper/fasteners and socks with PRN adaptive equipment while edge of bed  (Progressing)       Start:  06/20/24    Expected End:  07/04/24            Patient will complete toileting including hygiene clothing management/hygiene with modified independent level of assistance and bedside commode. (Progressing)       Start:  06/20/24    Expected End:  07/04/24                BALANCE       Pt will maintain dynamic standing balance during I/ADL task with  level of assistance in order to demonstrate decreased risk of falling and improved postural control. (Progressing)       Start:  06/20/24    Expected End:  07/04/24               MOBILITY       Patient will perform Functional mobility max Household distances with level of assistance in order to improve safety and functional mobility. (Progressing)       Start:  06/20/24    Expected End:  07/04/24               TRANSFERS       Patient will complete functional transfer to/from commode and room chair with independent level of assistance. (Progressing)       Start:  06/20/24    Expected End:  07/04/24

## 2024-06-20 NOTE — PROGRESS NOTES
"Marylu Amor is a 78 y.o. female on day 6 of admission presenting with Empyema (Multi).    Subjective   Patient feels better today. She was weaned off oxygen     Objective     Physical Exam  General: Lying in bed without distress.  Cooperative.  Skin: No rashes or ulcerations.  HEENT: Sclera is white.  Mucous membranes dry.  Cardiac: Regular rate and rhythm, S1/S2 normal.  Lungs: clear to auscultation, no wheezing, no rhonchi appreciated   Abdomen: Soft, nontender, moderately obese abdomen, BS +  Extremities: No cyanosis.  No lower extremity edema.  Neurologic: Alert and oriented x3.  No focal deficits.  Psychiatric: Appropriate mood and behavior.  Currently no agitation.  Last Recorded Vitals  Blood pressure 138/52, pulse 98, temperature 37 °C (98.6 °F), resp. rate 17, height 1.575 m (5' 2.01\"), weight 98.5 kg (217 lb 3.2 oz), SpO2 93%.  Intake/Output last 3 Shifts:  I/O last 3 completed shifts:  In: 600 (6.1 mL/kg) [P.O.:600]  Out: 100 (1 mL/kg) [Urine:100 (0 mL/kg/hr)]  Weight: 98.5 kg     Relevant Results  Scheduled medications  alteplase (Activase) 10 mg in sodium chloride 0.9% 50 mL syringe, 10 mg, intrapleural, Once  ampicillin-sulbactam, 3 g, intravenous, q6h  azithromycin, 500 mg, oral, q24h WILBERTO  dornase Alpha (Pulmozyme) 5 mg in sodium chloride 0.9% 50 mL syringe, 5 mg, intrapleural, Once  [Held by provider] enoxaparin, 40 mg, subcutaneous, q24h  lidocaine, 1 patch, transdermal, Daily  losartan 100 mg, hydroCHLOROthiazide 25 mg for Hyzaar 100/25, , oral, Daily  oxygen, , inhalation, Continuous - Inhalation  sennosides-docusate sodium, 2 tablet, oral, BID  sodium chloride 0.9 % 60 mL intrapleural syringe, , intrapleural, Once      Continuous medications     PRN medications  PRN medications: acetaminophen, albuterol, benzocaine-menthol, benzonatate, HYDROmorphone, hydrOXYzine HCL, ipratropium-albuteroL, ondansetron **OR** ondansetron, oxyCODONE, oxyCODONE     Assessment/Plan   Ms John is a 78-year-old " female with PMHx: HTN who presented to [facility] from [facility].  Patient states she had had a moist productive cough for the past 3 weeks it has been worsening states it been hurting to cough and she been waking up in the middle of the night diaphoretic.  She went to the urgent care in [place] was found to have a loculated pneumonia and was transferred to [facility] where they started her on IV antibiotics and she was then transferred to [facility] for further care.  Patient was started on azithromycin, Zosyn.  Thoracic surgery consulted.  Repeat CT chest done shows large multiloculated pleural effusion.  Thoracic surgery recommended interventional radiology for chest tube placement which patient received on 6/14.            Assessment/Plan   Acute hypoxemic respiratory failure/ multiloculated pleural effusion       1. Pleural fluid grew strep       2. Continue unasyn and transition to augmentin at discharge        3. Appreciate ID recs            2. HTN     1. Continue home Losartan/hydrochlorothiazide        Disposition: discharge tomorrow      I spent 35 minutes in the professional and overall care of this patient.       Leida Wood, DO

## 2024-06-24 DIAGNOSIS — J90 PLEURAL EFFUSION: ICD-10-CM

## 2024-07-05 ENCOUNTER — APPOINTMENT (OUTPATIENT)
Dept: SURGERY | Facility: HOSPITAL | Age: 79
End: 2024-07-05
Payer: MEDICARE

## 2024-07-12 ENCOUNTER — HOSPITAL ENCOUNTER (OUTPATIENT)
Dept: RADIOLOGY | Facility: HOSPITAL | Age: 79
Discharge: HOME | End: 2024-07-12
Payer: MEDICARE

## 2024-07-12 ENCOUNTER — OFFICE VISIT (OUTPATIENT)
Dept: SURGERY | Facility: HOSPITAL | Age: 79
End: 2024-07-12
Payer: MEDICARE

## 2024-07-12 VITALS
DIASTOLIC BLOOD PRESSURE: 70 MMHG | TEMPERATURE: 97 F | HEART RATE: 87 BPM | BODY MASS INDEX: 37.94 KG/M2 | RESPIRATION RATE: 16 BRPM | OXYGEN SATURATION: 100 % | SYSTOLIC BLOOD PRESSURE: 152 MMHG | WEIGHT: 207.5 LBS

## 2024-07-12 DIAGNOSIS — J86.9 EMPYEMA (MULTI): Primary | ICD-10-CM

## 2024-07-12 DIAGNOSIS — J90 PLEURAL EFFUSION: ICD-10-CM

## 2024-07-12 PROCEDURE — 71046 X-RAY EXAM CHEST 2 VIEWS: CPT | Performed by: RADIOLOGY

## 2024-07-12 PROCEDURE — 71046 X-RAY EXAM CHEST 2 VIEWS: CPT

## 2024-07-12 PROCEDURE — 3078F DIAST BP <80 MM HG: CPT | Performed by: THORACIC SURGERY (CARDIOTHORACIC VASCULAR SURGERY)

## 2024-07-12 PROCEDURE — 99215 OFFICE O/P EST HI 40 MIN: CPT | Performed by: THORACIC SURGERY (CARDIOTHORACIC VASCULAR SURGERY)

## 2024-07-12 PROCEDURE — 1126F AMNT PAIN NOTED NONE PRSNT: CPT | Performed by: THORACIC SURGERY (CARDIOTHORACIC VASCULAR SURGERY)

## 2024-07-12 PROCEDURE — 1111F DSCHRG MED/CURRENT MED MERGE: CPT | Performed by: THORACIC SURGERY (CARDIOTHORACIC VASCULAR SURGERY)

## 2024-07-12 PROCEDURE — 3077F SYST BP >= 140 MM HG: CPT | Performed by: THORACIC SURGERY (CARDIOTHORACIC VASCULAR SURGERY)

## 2024-07-12 ASSESSMENT — ENCOUNTER SYMPTOMS
PSYCHIATRIC NEGATIVE: 1
MUSCULOSKELETAL NEGATIVE: 1
NEUROLOGICAL NEGATIVE: 1
HEMATOLOGIC/LYMPHATIC NEGATIVE: 1
NAUSEA: 0
EYES NEGATIVE: 1
SHORTNESS OF BREATH: 0
CHILLS: 0
CONSTIPATION: 0
FEVER: 0
FATIGUE: 0
CHOKING: 0
ABDOMINAL DISTENTION: 0
VOMITING: 0
STRIDOR: 0
APPETITE CHANGE: 0
DIAPHORESIS: 0
ALLERGIC/IMMUNOLOGIC NEGATIVE: 1
DIARRHEA: 0
UNEXPECTED WEIGHT CHANGE: 0
PALPITATIONS: 0
ENDOCRINE NEGATIVE: 1
ABDOMINAL PAIN: 0
WHEEZING: 0
CHEST TIGHTNESS: 0
COUGH: 0

## 2024-07-12 ASSESSMENT — PAIN SCALES - GENERAL: PAINLEVEL: 0-NO PAIN

## 2024-07-12 NOTE — PROGRESS NOTES
Subjective   Patient ID: Marylu Amor is a 78 y.o. female who presents for Follow-up.  HPI    78-year-old female who admitted to hospital when she was admitted with a pleural effusion.  She was treated with a chest tube and antibiotics with good response.  She was discharged home.  She is here today for follow-up.  She has no breathing problems.  She is feeling much better.  Chest x-ray showing good lung expansion.  There is a questionable lung nodule in the previous CT scan.  On out follow-up in a couple of months with a CT of the chest.  No surgical intervention at this point.    Review of Systems   Constitutional:  Negative for appetite change, chills, diaphoresis, fatigue, fever and unexpected weight change.   HENT: Negative.     Eyes: Negative.    Respiratory:  Negative for cough, choking, chest tightness, shortness of breath, wheezing and stridor.    Cardiovascular:  Negative for chest pain, palpitations and leg swelling.   Gastrointestinal:  Negative for abdominal distention, abdominal pain, constipation, diarrhea, nausea and vomiting.   Endocrine: Negative.    Genitourinary: Negative.    Musculoskeletal: Negative.    Skin: Negative.    Allergic/Immunologic: Negative.    Neurological: Negative.    Hematological: Negative.    Psychiatric/Behavioral: Negative.     All other systems reviewed and are negative.      Objective   Physical Exam  Constitutional:       Appearance: Normal appearance.   HENT:      Head: Normocephalic and atraumatic.      Nose: Nose normal.      Mouth/Throat:      Mouth: Mucous membranes are moist.      Pharynx: Oropharynx is clear.   Eyes:      Extraocular Movements: Extraocular movements intact.      Conjunctiva/sclera: Conjunctivae normal.      Pupils: Pupils are equal, round, and reactive to light.   Cardiovascular:      Rate and Rhythm: Normal rate and regular rhythm.      Pulses: Normal pulses.      Heart sounds: Normal heart sounds.   Pulmonary:      Effort: Pulmonary effort is  normal. No respiratory distress.      Breath sounds: Normal breath sounds. No stridor. No wheezing, rhonchi or rales.   Chest:      Chest wall: No tenderness.   Abdominal:      General: Abdomen is flat. Bowel sounds are normal.      Palpations: Abdomen is soft.   Musculoskeletal:         General: Normal range of motion.      Cervical back: Normal range of motion and neck supple.   Skin:     General: Skin is warm and dry.      Capillary Refill: Capillary refill takes less than 2 seconds.   Neurological:      General: No focal deficit present.      Mental Status: She is alert and oriented to person, place, and time.         Assessment/Plan   Diagnoses and all orders for this visit:  Empyema (Multi)  Follow-up with CT chest.       Aguila Yeh MD 07/12/24 4:08 PM

## 2024-07-22 NOTE — DISCHARGE SUMMARY
Discharge Diagnosis  Acute hypoxic respiratory failure   Multiloculated pleural effusion   HTN  Pneumonia    Discharge Meds     Your medication list        START taking these medications        Instructions Last Dose Given Next Dose Due   amoxicillin-pot clavulanate 875-125 mg tablet  Commonly known as: Augmentin      Take 1 tablet by mouth 2 times a day for 28 days.       benzonatate 100 mg capsule  Commonly known as: Tessalon      Take 1 capsule (100 mg) by mouth 3 times a day as needed for cough for up to 10 days. Do not crush or chew.              CONTINUE taking these medications        Instructions Last Dose Given Next Dose Due   losartan-hydrochlorothiazide 100-25 mg tablet  Commonly known as: Hyzaar                     Where to Get Your Medications        These medications were sent to [facility]  [address], [address], [facility] [address]      Hours: 8AM to 6PM Mon-Fri, 8AM to 4PM Sat, 9AM to 1PM Sun Phone: [phone]   amoxicillin-pot clavulanate 875-125 mg tablet  benzonatate 100 mg capsule         Test Results Pending At Discharge  Pending Labs       No current pending labs.            Hospital Course    Ms Jhon is a 78-year-old female with PMHx: HTN who presented to [facility] from [facility]. Patient states she had had a moist productive cough for the past 3 weeks it has been worsening states it been hurting to cough and she been waking up in the middle of the night diaphoretic. She went to the urgent care in [place] was found to have a loculated pneumonia and was transferred to [facility] where they started her on IV antibiotics and she was then transferred to [facility] for further care. Patient was started on azithromycin, Zosyn. Thoracic surgery consulted. Repeat CT chest done shows large multiloculated pleural effusion. Thoracic surgery recommended interventional radiology for chest tube placement which patient received on 6/14. Patient grew strep.  Thoracic  removed pigtail drain. ID recommended Augmentin for 4 weeks and repeat CT. Patient is stable for discharge     Pertinent Physical Exam At Time of Discharge  Physical Exam  General: Lying in bed without distress.  Cooperative.  Skin: No rashes or ulcerations.  HEENT: Sclera is white.  Mucous membranes dry.  Cardiac: Regular rate and rhythm, S1/S2 normal.  Lungs: clear to auscultation, no wheezing, no rhonchi appreciated   Abdomen: Soft, nontender, moderately obese abdomen, BS +  Extremities: No cyanosis.  No lower extremity edema.  Neurologic: Alert and oriented x3.  No focal deficits.  Psychiatric: Appropriate mood and behavior.  Currently no agitation.  Outpatient Follow-Up  Future Appointments   Date Time Provider Department Center   9/4/2024 11:00 AM [id number] 1 [id number] [facility]   9/6/2024 10:15 AM Aguila Yeh MD [id number]       >35 minutes spent coordinating care    Leida Wood DO

## 2024-09-04 ENCOUNTER — APPOINTMENT (OUTPATIENT)
Dept: RADIOLOGY | Facility: CLINIC | Age: 79
End: 2024-09-04
Payer: MEDICARE

## 2024-09-06 ENCOUNTER — APPOINTMENT (OUTPATIENT)
Dept: SURGERY | Facility: HOSPITAL | Age: 79
End: 2024-09-06
Payer: MEDICARE